# Patient Record
Sex: FEMALE | Race: OTHER | Employment: UNEMPLOYED | URBAN - METROPOLITAN AREA
[De-identification: names, ages, dates, MRNs, and addresses within clinical notes are randomized per-mention and may not be internally consistent; named-entity substitution may affect disease eponyms.]

---

## 2020-08-13 ENCOUNTER — INITIAL PRENATAL (OUTPATIENT)
Dept: OBGYN | Age: 20
End: 2020-08-13

## 2020-08-13 ENCOUNTER — HOSPITAL ENCOUNTER (OUTPATIENT)
Age: 20
Discharge: HOME | End: 2020-08-15

## 2020-08-13 VITALS — WEIGHT: 128 LBS | SYSTOLIC BLOOD PRESSURE: 114 MMHG | DIASTOLIC BLOOD PRESSURE: 73 MMHG | HEART RATE: 74 BPM

## 2020-08-13 LAB
BASOPHILS ABSOLUTE: 0.04 E9/L (ref 0–0.2)
BASOPHILS RELATIVE PERCENT: 0.5 % (ref 0–2)
EOSINOPHILS ABSOLUTE: 0.07 E9/L (ref 0.05–0.5)
EOSINOPHILS RELATIVE PERCENT: 0.9 % (ref 0–6)
GLUCOSE TOLERANCE SCREEN 50G: 147 MG/DL (ref 70–140)
GLUCOSE URINE, POC: NORMAL
HCT VFR BLD CALC: 37.9 % (ref 34–48)
HEMOGLOBIN: 12.3 G/DL (ref 11.5–15.5)
IMMATURE GRANULOCYTES #: 0.06 E9/L
IMMATURE GRANULOCYTES %: 0.8 % (ref 0–5)
LYMPHOCYTES ABSOLUTE: 1.31 E9/L (ref 1.5–4)
LYMPHOCYTES RELATIVE PERCENT: 16.5 % (ref 20–42)
MCH RBC QN AUTO: 31.8 PG (ref 26–35)
MCHC RBC AUTO-ENTMCNC: 32.5 % (ref 32–34.5)
MCV RBC AUTO: 97.9 FL (ref 80–99.9)
MONOCYTES ABSOLUTE: 0.48 E9/L (ref 0.1–0.95)
MONOCYTES RELATIVE PERCENT: 6 % (ref 2–12)
NEUTROPHILS ABSOLUTE: 6 E9/L (ref 1.8–7.3)
NEUTROPHILS RELATIVE PERCENT: 75.3 % (ref 43–80)
PDW BLD-RTO: 12.6 FL (ref 11.5–15)
PLATELET # BLD: 236 E9/L (ref 130–450)
PMV BLD AUTO: 11.1 FL (ref 7–12)
PROTEIN UA: NEGATIVE
RBC # BLD: 3.87 E12/L (ref 3.5–5.5)
WBC # BLD: 8 E9/L (ref 4.5–11.5)

## 2020-08-13 PROCEDURE — 82950 GLUCOSE TEST: CPT

## 2020-08-13 PROCEDURE — 86900 BLOOD TYPING SEROLOGIC ABO: CPT

## 2020-08-13 PROCEDURE — 81002 URINALYSIS NONAUTO W/O SCOPE: CPT | Performed by: OBSTETRICS & GYNECOLOGY

## 2020-08-13 PROCEDURE — 86762 RUBELLA ANTIBODY: CPT

## 2020-08-13 PROCEDURE — 36415 COLL VENOUS BLD VENIPUNCTURE: CPT | Performed by: OBSTETRICS & GYNECOLOGY

## 2020-08-13 PROCEDURE — 87340 HEPATITIS B SURFACE AG IA: CPT

## 2020-08-13 PROCEDURE — 86901 BLOOD TYPING SEROLOGIC RH(D): CPT

## 2020-08-13 PROCEDURE — 85025 COMPLETE CBC W/AUTO DIFF WBC: CPT

## 2020-08-13 PROCEDURE — 87591 N.GONORRHOEAE DNA AMP PROB: CPT

## 2020-08-13 PROCEDURE — 86787 VARICELLA-ZOSTER ANTIBODY: CPT

## 2020-08-13 PROCEDURE — 36415 COLL VENOUS BLD VENIPUNCTURE: CPT

## 2020-08-13 PROCEDURE — 99203 OFFICE O/P NEW LOW 30 MIN: CPT | Performed by: OBSTETRICS & GYNECOLOGY

## 2020-08-13 PROCEDURE — 86850 RBC ANTIBODY SCREEN: CPT

## 2020-08-13 PROCEDURE — 86703 HIV-1/HIV-2 1 RESULT ANTBDY: CPT

## 2020-08-13 PROCEDURE — 81220 CFTR GENE COM VARIANTS: CPT

## 2020-08-13 PROCEDURE — 87491 CHLMYD TRACH DNA AMP PROBE: CPT

## 2020-08-13 PROCEDURE — 86592 SYPHILIS TEST NON-TREP QUAL: CPT

## 2020-08-13 RX ORDER — PNV NO.95/FERROUS FUM/FOLIC AC 28MG-0.8MG
1 TABLET ORAL DAILY
Qty: 30 TABLET | Refills: 12
Start: 2020-08-13

## 2020-08-13 SDOH — HEALTH STABILITY: MENTAL HEALTH: HOW OFTEN DO YOU HAVE A DRINK CONTAINING ALCOHOL?: NEVER

## 2020-08-13 NOTE — PROGRESS NOTES
First visit for this pregnancy. Non-english speaking patient. Karyle Hensen here to translate today. Thinks her LMP was somewhere in the Month of January but does not remember when. Get referral to [facility] ASAP. appears to be about 28 weeks or so. Labs requested and Cultures done  WEill give tentative edc of 10/19/2020 based on uterine size.

## 2020-08-13 NOTE — PROGRESS NOTES
Patient is here today for new prenatal visit, Gem Montero was used to interpret today's visit. Patient had no prenatal care, patient is unsure of lmp. A 1 hour glucose was performed along with prenatal blood work and cultures.

## 2020-08-14 LAB
HEPATITIS B SURFACE ANTIGEN INTERPRETATION: NORMAL
HIV-1 AND HIV-2 ANTIBODIES: NORMAL
RPR: NORMAL

## 2020-08-15 LAB
ABO/RH: NORMAL
ANTIBODY SCREEN: NORMAL

## 2020-08-17 LAB
C TRACH DNA GENITAL QL NAA+PROBE: ABNORMAL
N. GONORRHOEAE DNA: NEGATIVE
RUBELLA ANTIBODY IGG: NORMAL
SOURCE: ABNORMAL

## 2020-08-18 LAB — VARICELLA-ZOSTER VIRUS AB, IGG: NORMAL

## 2020-08-19 ENCOUNTER — ROUTINE PRENATAL (OUTPATIENT)
Dept: OBGYN CLINIC | Age: 20
End: 2020-08-19

## 2020-08-19 VITALS
TEMPERATURE: 98.2 F | WEIGHT: 126 LBS | HEART RATE: 79 BPM | SYSTOLIC BLOOD PRESSURE: 119 MMHG | DIASTOLIC BLOOD PRESSURE: 81 MMHG

## 2020-08-19 PROCEDURE — 81002 URINALYSIS NONAUTO W/O SCOPE: CPT | Performed by: OBSTETRICS & GYNECOLOGY

## 2020-08-19 PROCEDURE — 76819 FETAL BIOPHYS PROFIL W/O NST: CPT | Performed by: OBSTETRICS & GYNECOLOGY

## 2020-08-19 PROCEDURE — 76811 OB US DETAILED SNGL FETUS: CPT | Performed by: OBSTETRICS & GYNECOLOGY

## 2020-08-19 PROCEDURE — 76820 UMBILICAL ARTERY ECHO: CPT | Performed by: OBSTETRICS & GYNECOLOGY

## 2020-08-19 PROCEDURE — 99203 OFFICE O/P NEW LOW 30 MIN: CPT | Performed by: OBSTETRICS & GYNECOLOGY

## 2020-08-19 PROCEDURE — 99202 OFFICE O/P NEW SF 15 MIN: CPT | Performed by: OBSTETRICS & GYNECOLOGY

## 2020-08-19 RX ORDER — AZITHROMYCIN 500 MG/1
1000 TABLET, FILM COATED ORAL ONCE
Qty: 2 TABLET | Refills: 0 | Status: SHIPPED | OUTPATIENT
Start: 2020-08-19 | End: 2020-08-19

## 2020-08-19 NOTE — PROGRESS NOTES
Pt denies bleeding,lof,ctxPt states good fetal movement. Kick counts encouraged. All questions answered+ information confirmed by pt using [employer] # [id number]

## 2020-08-19 NOTE — PATIENT INSTRUCTIONS
Please arrive for your scheduled appointment at least 15 minutes early with your actual insurance card+ a photo ID. Also if you need any refills ordered or have questions, it may take up 48 hours to reply. Please allow ample time for your refills. Call me when you use last refill. Thank you for your cooperation. Any questions contact [facility] at [phone]. If you are experiencing an emergency and need immediate help, call 911 or go to go emergency room or labor and delivery. if you are sick, not feeling well or have an infectious process going on please reschedule your appointment by calling [phone]. Also if any family members are not feeling well, please do not bring them to your appointment. We appreciate your cooperation. We are doing this in order to protect our pregnant mothers+ their babies. Call your primary obstetrician with bleeding, leaking of fluid, abdominal tenderness, headache, blurry vision, epigastric pain and increased urinary frequency. Do kick counts after dinner. Call your primary obstetrician if less than 10 kicks in 2 hours after dinner. Call your primary obstetrician with bleeding, leaking of fluid, abdominal tenderness, headache, blurry vision, epigastric pain and increased urinary frequency. Patient Education        Weeks 30 to 28 of Your Pregnancy: Care Instructions  Your Care Instructions     You have made it to the final months of your pregnancy. By now, your baby is really starting to look like a baby, with hair and plump skin. As you enter the final weeks of pregnancy, the reality of having a baby may start to set in. This is the time to settle on a name, get your household in order, set up a safe nursery, and find quality  if needed. Doing these things in advance will allow you to focus on caring for and enjoying your new baby.  You may also want to have a tour of your hospital's labor and delivery unit to get a better idea of what to expect while you are in the sodas.  · Evite doblarse hacia adelante o acostarse después de comer. · Ivonne kaden caminata corta después de comer. · Si tiene problemas de [place] estomacal marisa la noche, no coma por 2 horas antes de acostarse. · [place] antiácidos, toan Mylanta, Maalox, Rolaids o Tums. No tome antiácidos que contengan bicarbonato de sodio. Cuide las várices  · Las várices son vasos sanguíneos que se dilatan debido a la mayor cantidad de campos marisa el embarazo. Puede tener dolor o palpitaciones en las piernas. La mayoría de las várices desaparecerán después del [place]. · Evite estar reyes marisa mucho tiempo. Siéntese con las piernas cruzadas a la altura de los tobillos, no de las rodillas. · Siéntese con los pies elevados. · Evite usar ropa o medias ajustadas. Use medias de compresión. · Ivonne ejercicio con regularidad. Trate de caminar por lo menos 30 minutos al día. ¿Dónde puede encontrar más información en [facility]? [place] a [email or web address] e ingrese a sandy cuenta de MyChart. Caren Faustin M281 en el [facility] \"Search Health Information\" para más información (en inglés) sobre \"Semanas 30 a 28 de sandy embarazo: Instrucciones de cuidado. \"     Si no tiene kaden cuenta, ivonne jairo en el enlace \"Sign Up Now\". Revisado: 11 febrero, 2020               Versión del contenido: 12.5  © 3086-4658 Healthwise, Incorporated. Las instrucciones de cuidado fueron adaptadas bajo licencia por [facility] ([facility]). Si usted tiene [place] afección médica o sobre estas instrucciones, siempre pregunte a sandy profesional de balbir. [employer] niega toda garantía o responsabilidad por sandy uso de esta información. Patient Education        Henry Batter a sandy médico marisa el [address] [place] [address] [address] (después de 20 semanas)  Learning About When to Call Your Doctor During Pregnancy (After 20 Weeks)  Instrucciones de cuidado  Es normal que tenga inquietudes acerca de lo que podría ser un problema marisa el [address] [place] [address] [address]  Aunque la mayoría de las mujeres embarazadas no tienen ningún problema grave, es importante saber cuándo llamar a sandy médico si tiene determinados síntomas o señales de trabajo de [place]. Estas son algunas sugerencias generales. Sandy médico puede darle más información sobre cuándo llamar. Cuándo llamar a sandy médico (después de 20 semanas)  Llame al 911 en cualquier momento que considere que necesita atención de [place]. Por ejemplo, llame si:  · Tiene sangrado vaginal intenso. · Tiene dolor repentino e intenso en el abdomen. · Se desmayó (perdió el conocimiento). · Tiene kaden convulsión. · Ve o siente el cordón umbilical.  · Vanessa que está a punto de laurie a marco a sandy bebé y no puede llegar en forma cool al hospital.  Veronda Flynn a sandy médico ahora mismo o busque atención médica inmediata si:  · Tiene sangrado vaginal.  · Tiene dolor en el abdomen. · Tiene fiebre. · Tiene síntomas de preeclampsia, toan:  ? [place] repentina de la keith, las papito o los pies. ? Nuevos problemas de visión (toan oscurecimiento, ameya borroso o ameya puntos). ? Dolor de everett intenso. · Tiene kaden pérdida repentina de líquido por la vagina. (Piensa que rompió la clarke). · Piensa que puede keiry comenzado el [place] de [place]. [place] significa que ha tenido al menos 6 contracciones en [employer]. · Nota que sandy bebé ha dejado de moverse o lo hace mucho menos de lo habitual.  · Tiene síntomas de kaden infección urinaria. Estos pueden incluir:  ? Dolor o ardor al orinar. ? Necesidad de orinar con frecuencia sin poder eliminar mucha orina. ? Dolor en el flanco, que se encuentra mike debajo de la caja torácica y [place] de la cintura en un lado de la espalda. ? [employer] Preste especial atención a los cambios en sandy balbir y asegúrese de comunicarse con sandy médico si:  · Tiene flujo vaginal con un olor desagradable. · Tiene cambios en la piel, tales toan:  ? Salpullido. ? Comezón. ? Color amarillento en la piel.   · Tiene otras inquietudes acerca de sandy embarazo. Si tiene signos de trabajo de parto al llegar a las [address] [facility] o más  Si tiene señales de [place] de parto a las 37 semanas o [place], es posible que sandy médico le diga que llame cuando sandy trabajo de parto se vuelva más [place]. Los síntomas del trabajo de parto activo incluyen:  · Contracciones que son regulares. · Contracciones a intervalos de menos de 5 minutos. · Contracciones marisa las cuales es difícil hablar. La atención de seguimiento es kaden parte clave de sandy tratamiento y seguridad. Asegúrese de hacer y acudir a todas las citas, y llame a sandy médico si está teniendo problemas. También es kaden buena idea saber los resultados de dorcas exámenes y mantener kaden lista de los medicamentos que corey. ¿Dónde puede encontrar más información en inglés? Terrell Anchors a [email or web address] e ingrese a sandy cuenta de Birgit Abad Person X365 en el Nadine Vinson \"Search Health Information\" para más información (en inglés) sobre \"Aprenda cuándo llamar a sandy médico marisa el embarazo (después de 20 semanas). \"     Si no tiene kaden cuenta, ivonne jairo en el enlace \"Sign Up Now\". Revisado: 11 febrero, 2020               Versión del contenido: 12.5  © 8138-2776 Healthwise, Incorporated. Las instrucciones de cuidado fueron adaptadas bajo licencia por [facility] ([facility]). Si usted tiene [place] afección médica o sobre estas instrucciones, siempre pregunte a sandy profesional de balbir. Healthwise, Incorporated niega toda garantía o responsabilidad por sandy uso de esta información. Patient Education        Conteo de las patadas de sandy bebé: Instrucciones de cuidado  [employer] Your Baby's Kicks: Care Instructions  Instrucciones de cuidado    Contar las patadas de sandy bebé es kaden manera en la que sandy médico puede establecer si sandy bebé es saludable. La mayoría de las [facility], sobre todo en el primer embarazo, siente que sandy bebé se mueve por primera vez entre las semanas 12 y 25.  El movimiento puede sentirse [place] el enlace \"Sign Up Now\". Revisado: 11 febrero, 2020               Versión del contenido: 12.5  © 3357-2205 Healthwise, [employer]. Las instrucciones de cuidado fueron adaptadas bajo licencia por [facility] ([facility]). Si usted tiene [place] afección médica o sobre estas instrucciones, siempre pregunte a sandy profesional de balbir. [employer] niega toda garantía o responsabilidad por sandy uso de esta información.

## 2020-08-25 ENCOUNTER — NURSE ONLY (OUTPATIENT)
Dept: PRIMARY CARE CLINIC | Age: 20
End: 2020-08-25

## 2020-08-25 ENCOUNTER — HOSPITAL ENCOUNTER (OUTPATIENT)
Age: 20
Discharge: HOME | End: 2020-08-27

## 2020-08-25 PROCEDURE — U0003 INFECTIOUS AGENT DETECTION BY NUCLEIC ACID (DNA OR RNA); SEVERE ACUTE RESPIRATORY SYNDROME CORONAVIRUS 2 (SARS-COV-2) (CORONAVIRUS DISEASE [COVID-19]), AMPLIFIED PROBE TECHNIQUE, MAKING USE OF HIGH THROUGHPUT TECHNOLOGIES AS DESCRIBED BY CMS-2020-01-R: HCPCS

## 2020-08-26 LAB
SARS-COV-2: NOT DETECTED
SOURCE: NORMAL

## 2020-08-26 NOTE — PROGRESS NOTES
[facility]  [address] [place] [address].  [address] [facility], [place]. [address]  Ph: [phone]    Fax: [phone]   2020    RE: Loyd Chana  2000  Dear Dr. Nila Elizondo    : Thank you for sending   Ms. Raj Hogue  for consultation and ultrasound  in our office   on  2020. REASON FOR CONSULTATION: 17yo  ? [facility] 10/19/2020. Non-English Speaking- discussed through tele- . Young Maternal Age- Mauricio Greco stephanie     Her chart was reviewed, her medical, surgical , and OBG history was examined along with any supporting documents available to me .  Her recent clinical visits and notes were reviewed.  Her recent laboratory and pathology  testing was reviewed  Review of Systems :    CONSTITUTIONAL: No fever, no chills , no undue aches, pain    HEENT: No headache, no visual changes, no sore throat    PULM: No dyspnea, no cough   CARDIO:  No chest pains, no palpitations   GI: No N/V, no D/C, no diarrhea, no abdominal pain     : No dysuria, no vaginal bleeding or fluid leaking or discharge    She reports good fetal movement   PHYSICAL EXAMINATION: VSS - Afebrile     General Appearance: Healthy looking, alert , no acute distress.  Eyes: No pallor, no icterus, no photophobia.  Back: No CVA tenderness.  Abdomen: Soft, non-tender.  Extremities: No pretibial pitting edema    An ultrasound evaluation was done today. Please refer to the enclosed copy of the ultrasound report for further detailed information. ULTRASOUND IMPRESSION:    ? US is not diagnostic for fetal aneuploidy and may not detect all structural defects even if multiple exams are performed. ? Normal ultrasound findings cannot guarantee normal pregnancy outcomes. ? Within developmental and technical limits of ultrasound assessment,   ? Vertex fetus @  31w3d  ? Active, responsive baby.  The amniotic fluid volume appears normal.   ? The fetus is measuring small/ ? appropriate for greater than 50% of the time was used to  the patient, discuss complications and problems related to her pregnancy, or coordinating her care. I answered all of her questions to her satisfaction.

## 2020-08-27 LAB
GLUCOSE URINE, POC: NORMAL
PROTEIN UA: NEGATIVE

## 2020-08-28 LAB
CYSTIC FIBROSIS 165 VARIANTS INTERP: NORMAL
CYSTIC FIBROSIS 5T VARIANT: NORMAL
CYSTIC FIBROSIS ALLELE 1: NEGATIVE
CYSTIC FIBROSIS ALLELE 2: NEGATIVE

## 2020-08-31 ENCOUNTER — HOSPITAL ENCOUNTER (OUTPATIENT)
Age: 20
Discharge: HOME | End: 2020-08-31

## 2020-08-31 LAB
GLUCOSE TOLERANCE TEST 1 HOUR: 119 MG/DL
GLUCOSE TOLERANCE TEST 2 HOUR: 107 MG/DL
GLUCOSE TOLERANCE TEST FASTING: 77 MG/DL

## 2020-08-31 PROCEDURE — 36415 COLL VENOUS BLD VENIPUNCTURE: CPT

## 2020-08-31 PROCEDURE — 82951 GLUCOSE TOLERANCE TEST (GTT): CPT

## 2020-09-10 ENCOUNTER — HOSPITAL ENCOUNTER (OUTPATIENT)
Age: 20
Discharge: HOME | End: 2020-09-12

## 2020-09-10 ENCOUNTER — ROUTINE PRENATAL (OUTPATIENT)
Dept: OBGYN | Age: 20
End: 2020-09-10

## 2020-09-10 VITALS — DIASTOLIC BLOOD PRESSURE: 66 MMHG | SYSTOLIC BLOOD PRESSURE: 102 MMHG | WEIGHT: 135 LBS | HEART RATE: 76 BPM

## 2020-09-10 PROCEDURE — 87591 N.GONORRHOEAE DNA AMP PROB: CPT

## 2020-09-10 PROCEDURE — 87491 CHLMYD TRACH DNA AMP PROBE: CPT

## 2020-09-10 PROCEDURE — 99212 OFFICE O/P EST SF 10 MIN: CPT | Performed by: OBSTETRICS & GYNECOLOGY

## 2020-09-10 PROCEDURE — 99213 OFFICE O/P EST LOW 20 MIN: CPT | Performed by: OBSTETRICS & GYNECOLOGY

## 2020-09-10 PROCEDURE — 87081 CULTURE SCREEN ONLY: CPT

## 2020-09-10 NOTE — PROGRESS NOTES
Here for routine prenatl visit. Centering pregnancy. Now 34+ weeks. Will do cultures today and send to lab. Marilu Potter here to translate for this entire visit today. Denies any problems at this time. No LOF or cramping. Labor warnings given. All questions answered. RTC weekly to delivery. Needs follow up at [facility] scheduled.

## 2020-09-13 LAB
C TRACH DNA GENITAL QL NAA+PROBE: NEGATIVE
GROUP B STREP CULTURE: NORMAL
N. GONORRHOEAE DNA: NEGATIVE
SOURCE: NORMAL

## 2020-09-17 ENCOUNTER — ROUTINE PRENATAL (OUTPATIENT)
Dept: OBGYN | Age: 20
End: 2020-09-17

## 2020-09-17 VITALS — SYSTOLIC BLOOD PRESSURE: 100 MMHG | DIASTOLIC BLOOD PRESSURE: 63 MMHG | WEIGHT: 140 LBS | HEART RATE: 67 BPM

## 2020-09-17 LAB
GLUCOSE URINE, POC: NORMAL
PROTEIN UA: NEGATIVE

## 2020-09-17 PROCEDURE — 99212 OFFICE O/P EST SF 10 MIN: CPT | Performed by: OBSTETRICS & GYNECOLOGY

## 2020-09-17 PROCEDURE — 99213 OFFICE O/P EST LOW 20 MIN: CPT | Performed by: OBSTETRICS & GYNECOLOGY

## 2020-09-17 PROCEDURE — 81002 URINALYSIS NONAUTO W/O SCOPE: CPT | Performed by: OBSTETRICS & GYNECOLOGY

## 2020-09-17 NOTE — PROGRESS NOTES
Here for routine prenatal, Centering pregnancy. Evon Bello here to translate for this entire visit. GBS was Negative. Denies any problems at this time. Denies LOF, has good movement. No Cramping. Has MFM follow up tomorrow. RTC 1 week to delivery. Labor warnings given. All questions answered.

## 2020-09-18 ENCOUNTER — ROUTINE PRENATAL (OUTPATIENT)
Dept: OBGYN CLINIC | Age: 20
End: 2020-09-18

## 2020-09-18 VITALS
WEIGHT: 140 LBS | DIASTOLIC BLOOD PRESSURE: 72 MMHG | HEART RATE: 65 BPM | TEMPERATURE: 97.7 F | SYSTOLIC BLOOD PRESSURE: 107 MMHG

## 2020-09-18 PROCEDURE — 99214 OFFICE O/P EST MOD 30 MIN: CPT | Performed by: OBSTETRICS & GYNECOLOGY

## 2020-09-18 PROCEDURE — 76816 OB US FOLLOW-UP PER FETUS: CPT | Performed by: OBSTETRICS & GYNECOLOGY

## 2020-09-18 PROCEDURE — 76820 UMBILICAL ARTERY ECHO: CPT | Performed by: OBSTETRICS & GYNECOLOGY

## 2020-09-18 PROCEDURE — 81002 URINALYSIS NONAUTO W/O SCOPE: CPT | Performed by: OBSTETRICS & GYNECOLOGY

## 2020-09-18 PROCEDURE — 76819 FETAL BIOPHYS PROFIL W/O NST: CPT | Performed by: OBSTETRICS & GYNECOLOGY

## 2020-09-18 PROCEDURE — 76821 MIDDLE CEREBRAL ARTERY ECHO: CPT | Performed by: OBSTETRICS & GYNECOLOGY

## 2020-09-18 PROCEDURE — 99213 OFFICE O/P EST LOW 20 MIN: CPT | Performed by: OBSTETRICS & GYNECOLOGY

## 2020-09-18 NOTE — PATIENT INSTRUCTIONS
Please arrive for your scheduled appointment at least 15 minutes early with your actual insurance card+ a photo ID. Also if you need any refills ordered or have questions, it may take up 48 hours to reply. Please allow ample time for your refills. Call me when you use last refill. Thank you for your cooperation. Any questions contact [facility] at [phone]. If you are experiencing an emergency and need immediate help, call 911 or go to go emergency room or labor and delivery. if you are sick, not feeling well or have an infectious process going on please reschedule your appointment by calling [phone]. Also if any family members are not feeling well, please do not bring them to your appointment. We appreciate your cooperation. We are doing this in order to protect our pregnant mothers+ their babies. Call your primary obstetrician with bleeding, leaking of fluid, abdominal tenderness, headache, blurry vision, epigastric pain and increased urinary frequency. Do kick counts after dinner. Call your primary obstetrician if less than 10 kicks in 2 hours after dinner. Call your primary obstetrician with bleeding, leaking of fluid, abdominal tenderness, headache, blurry vision, epigastric pain and increased urinary frequency. You might be having an NST at your next appt. Please eat a large snack or breakfast before coming to office. Thank you  Patient Education        Semanas 34 a 39 de sandy embarazo: Instrucciones de cuidado  Weeks 34 to 36 of Your Pregnancy: Care Instructions  Instrucciones de cuidado    A estas [place], sandy bebé y sandy abdomen habrán crecido considerablemente. Holly es [place] de laurie a marco. Los pulmones de sandy bebé están holly listos para respirar aire. Los huesos de la everett de sandy bebé ahora son bastante firmes toan para protegerla rekha se mantienen lo suficientemente blandos toan para atravesar el canal de [place]. Es posible que sienta entusiasmo, steve, ansiedad o miedo.  Cindy Frances se pregunte cómo se dará cuenta de si está en trabajo de parto o qué esperar en sabas momento. Trate de ser flexible con dorcas expectativas respecto del nacimiento. Dado que cada nacimiento es diferente, no hay manera de saber exactamente cómo será sandy parto. Esta hoja de cuidados la ayudará a saber qué esperar y cómo prepararse. Le podría facilitar el parto. Si todavía no le solis aplicado la vacuna Tdap (tétanos, difteria y tos [place]) marisa soledad [place], hable con sandy médico acerca de aplicársela. [place] a proteger a sandy recién nacido contra la infección por tos ferina. En la semana 36, a la mayoría de las mujeres se les hace kaden prueba de estreptococos del geeta B (GBS, por dorcas siglas en inglés). Los estreptococos del geeta B son bacterias comunes que pueden vivir en la vagina y el recto. Pueden hacer que sandy bebé se enferme después del parto. Si el resultado es positivo, usted recibirá antibióticos marisa el trabajo de [place]. Los medicamentos evitarán que sandy bebé contraiga las bacterias. La atención de seguimiento es kaden parte clave de sandy tratamiento y seguridad. Asegúrese de hacer y acudir a todas las citas, y llame a sandy médico si está teniendo problemas. También es kaden buena idea saber los resultados de dorcas exámenes y mantener kaden lista de los medicamentos que corey. ¿Cómo puede cuidarse en el hogar? Aprenda sobre las alternativas para aliviar el dolor  · El dolor se manifiesta de modo diferente en cada todd. Hable con sandy médico acerca de dorcas sentimientos sobre el dolor. · Puede elegir entre varias formas de aliviar el dolor. Estas incluyen medicamentos o técnicas de respiración, así toan medidas para estar cómoda. Usted puede utilizar más de [place] opción. · Si elige un analgésico (medicamento para el dolor) marisa el trabajo de [place], hable con sandy médico acerca de dorcas opciones. Algunos medicamentos reducen la ansiedad y [facility] a aliviar parte del dolor.  Otros adormecen la parte inferior del cuerpo para que no sienta dolor.  · Asegúrese de decirle a sandy médico acerca de sandy elección de analgésico antes de empezar el [place] de parto o muy temprano en el [place] de [place]. Es posible que pueda cambiar de parecer a medida que avanza el [place] de [place]. · Savana vez se duerme a kaden todd con medicamentos administrados a través de kaden máscara o por vía intravenosa (IV). Trabajo de parto y [place]  · La primera etapa del [place] de parto se divide en fernie fases: Antoine Dash y de transición. ? La mayoría de las mujeres experimentan la fase latente del [place] de parto en dorcas hogares. Usted puede [employer] o descansar, comer refrigerios livianos, beber líquidos sylwia y comenzar a contar las contracciones. ? Cuando advierta que se le vuelve difícil hablar marisa kaden contracción, es posible que esté por pasar a la fase activa. Marisa la fase Lola Donnelly, debería ir al hospital si no está allí aún. ? Usted está en la fase activa cuando tiene contracciones cada 3 o 4 minutos y young alrededor de 60 segundos. El ralf uterino comienza a abrirse con más rapidez.  ? Si se le rompe la clarke, las contracciones serán más intensas y más frecuentes. ? Marisa la fase de transición, el ralf uterino se estira y las contracciones se producen con [place]. ? Lindsay Juan Pablo tenga deseos de pujar, sin embargo es posible que el ralf uterino aún no esté preparado. El médico le dirá cuándo pujar. · La segunda etapa comienza cuando el ralf uterino se abre por completo y usted está lista para pujar. ? Las contracciones son muy intensas a fin de empujar al bebé por el canal de parto. ? Sentirá la necesidad de pujar. Podría sentir toan si tuviera ganas de evacuar el intestino. ? Hayde Karvonen entrenen a [employer]. Estas contracciones serán muy intensas rekha no ocurrirán con tanta frecuencia. Puede descansar un poco entre contracciones. ? Es posible que esté sensible e irritable.  Es posible que no se dé cuenta de lo que pasa a sandy alrededor. ? Un último esfuerzo y habrá nacido sandy bebé. · La tercera etapa ocurre cuando con unas cuantas contracciones más se expulsa la placenta. [place] puede durar 30 minutos o menos. · La cuarta etapa es la de recuperación. Es posible que se sienta abrumada con las emociones y exhausta rekha alerta. Pilar es un buen momento para comenzar el amamantamiento. ¿Dónde puede encontrar más información en inglés? Amyn Leticia a [email or web address] e ingrese a sandy cuenta de MyChart. Maxine Zuleta B622 en el [facility] \"Search Health Information\" para más información (en inglés) sobre \"Semanas 29 a 39 de sandy embarazo: Instrucciones de cuidado. \"     Si no tiene kaden cuenta, ivonne jairo en el enlace \"Sign Up Now\". Revisado: 11 febrero, 2020               Versión del contenido: 12.5  © 6766-1738 Healthwise, Incorporated. Las instrucciones de cuidado fueron adaptadas bajo licencia por [facility] ([facility]). Si usted tiene Gary Willis afección médica o sobre estas instrucciones, siempre pregunte a sandy profesional de balbir. Healthwise, Incorporated niega toda garantía o responsabilidad por sandy uso de esta información. Patient Education        Hilario Carls a sandy médico marisa el [address] [facility] [address] (después de 20 semanas)  Learning About When to Call Your Doctor During Pregnancy (After 20 Weeks)  Instrucciones de cuidado  Es normal que tenga inquietudes acerca de lo que podría ser un problema marisa el [address] [facility] [facility] [address]. Aunque la mayoría de las mujeres embarazadas no tienen ningún problema grave, es importante saber cuándo llamar a sandy médico si tiene determinados síntomas o señales de trabajo de [place]. Estas son algunas sugerencias generales. Sandy médico puede darle más información sobre cuándo llamar. Cuándo llamar a sandy médico (después de 20 semanas)  Llame al 911 en cualquier momento que considere que necesita atención de [place]. Por ejemplo, llame si:  · Tiene sangrado vaginal intenso.   · Tiene dolor repentino e intenso en el abdomen. · Se desmayó (perdió el conocimiento). · Tiene kaden convulsión. · Ve o siente el cordón umbilical.  · Vanessa que está a punto de laurie a marco a sandy bebé y no puede llegar en forma cool al hospital.  Renee Le a sandy médico ahora mismo o busque atención médica inmediata si:  · Tiene sangrado vaginal.  · Tiene dolor en el abdomen. · Tiene fiebre. · Tiene síntomas de preeclampsia, toan:  ? Hinchazón repentina de la keith, las papito o los pies. ? Nuevos problemas de visión (toan oscurecimiento, ameya borroso o ameya puntos). ? Dolor de everett intenso. · Tiene kaden pérdida repentina de líquido por la vagina. (Piensa que rompió la clarke). · Piensa que puede keiry comenzado el [place] de [place]. Vivian significa que ha tenido al menos 6 contracciones en [employer]. · Nota que sandy bebé ha dejado de moverse o lo hace mucho menos de lo habitual.  · Tiene síntomas de kaden infección urinaria. Estos pueden incluir:  ? Dolor o ardor al orinar. ? Necesidad de orinar con frecuencia sin poder eliminar mucha orina. ? Dolor en el flanco, que se encuentra mike debajo de la caja torácica y [place] de la cintura en un lado de la espalda. ? [employer]. Preste especial atención a los cambios en sandy balbir y asegúrese de comunicarse con sandy médico si:  · Tiene flujo vaginal con un olor desagradable. · Tiene cambios en la piel, tales toan:  ? Salpullido. ? Comezón. ? Color amarillento en la piel. · Tiene otras inquietudes acerca de sandy embarazo. Si tiene signos de trabajo de parto al llegar a las [address] o más  Si tiene señales de [place] de parto a las 37 semanas o [place], es posible que sandy médico le diga que llame cuando sandy trabajo de parto se vuelva más [place]. Los síntomas del trabajo de parto activo incluyen:  · Contracciones que son regulares. · Contracciones a intervalos de menos de 5 minutos. · Contracciones marisa las cuales es difícil hablar.   La atención de seguimiento es kaden parte clave de sandy tratamiento y seguridad. Asegúrese de hacer y acudir a todas las citas, y llame a sandy médico si está teniendo problemas. También es kaden buena idea saber los resultados de dorcas exámenes y mantener kaden lista de los medicamentos que corey. ¿Dónde puede encontrar más información en inglés? Shelly Alegrias a [email or web address] e ingrese a sandy cuenta de MyChart. Ezra Larry M166 en el Annye Sandoval \"Search Health Information\" para más información (en inglés) sobre \"Aprenda cuándo llamar a sandy médico marisa el embarazo (después de 20 semanas). \"     Si no tiene kaden cuenta, ivonne jairo en el enlace \"Sign Up Now\". Revisado: 11 febrero, 2020               Versión del contenido: 12.5  © 7172-0752 Healthwise, Incorporated. Las instrucciones de cuidado fueron adaptadas bajo licencia por [facility] ([facility]). Si usted tiene Benld Winger afección médica o sobre estas instrucciones, siempre pregunte a sandy profesional de balbir. Healthwise, Incorporated niega toda garantía o responsabilidad por sandy uso de esta información. Patient Education        Conteo de las patadas de sandy bebé: Instrucciones de cuidado  [employer] Your Baby's Kicks: Care Instructions  Instrucciones de cuidado    Contar las patadas de sandy bebé es kaden manera en la que sandy médico puede establecer si sandy bebé es saludable. La mayoría de las [facility], sobre todo en el primer embarazo, siente que sandy bebé se mueve por primera vez entre las semanas 12 y 25. El movimiento puede sentirse más toan aleteos que toan patadas. Es posible que sandy bebé se mueva más a ciertas horas del día. Cuando usted Yoan Fisher, puede notar menos patadas que cuando está descansando. En dorcas visitas prenatales, sandy médico le preguntará si el bebé está activo. En el último trimestre, sandy médico le puede pedir que cuente la cantidad de veces que sienta que el bebé se [place]. La atención de seguimiento es kaden parte clave de sandy tratamiento y seguridad.  Asegúrese de hacer y acudir a todas las citas, y llame a sandy médico si está teniendo problemas. También es kaden buena idea saber los resultados de dorcas exámenes y mantener kaden lista de los medicamentos que corey. ¿Cómo se cuentan las patadas fetales? · Un método común para revisar el movimiento de sandy bebé es contar la cantidad de patadas o movimientos que sienta en 1 hora. Es normal sentir 10 movimientos (toan patadas, aleteos o vueltas) en 1 hora. Algunos médicos sugieren que cuente marisa la [facility] llegar a los 10 movimientos. Luego usted puede dejar de contar por sabas día y comenzar otra vez al día siguiente. · Para contar, elija el momento del día en que sandy bebé esté más activo. Podría ser cualquier momento entre la mañana y la noche. · Si no siente 10 movimientos en kaden hora, es posible que sandy bebé esté durmiendo. Espere hasta la próxima hora y vuelva a contar. ¿Cuándo debe pedir ayuda? Llame a sandy médico ahora mismo o busque atención médica inmediata si:  · Siente que sandy bebé ha dejado de moverse o se mueve mucho menos de lo normal.  Preste especial atención a los cambios en sandy balbir y asegúrese de comunicarse con sandy médico si tiene cualquier problema. ¿Dónde puede encontrar más información en inglés? Unruly Pickard a [email or web address] e ingrese a sandy cuenta de [place]. Juan Pablo Varela W824 en el [facility] \"Search Health Information\" para más información (en inglés) sobre \"Conteo de las patadas de sandy bebé: Instrucciones de cuidado. \"     Si no tiene kaden cuenta, ivonne jairo en el enlace \"Sign Up Now\". Revisado: 11 febrero, 2020               Versión del contenido: 12.5  © 8509-5045 Healthwise, [employer]. Las instrucciones de cuidado fueron adaptadas bajo licencia por [facility] ([facility]). Si usted tiene [place] afección médica o sobre estas instrucciones, siempre pregunte a sandy profesional de balbir. Relaborate, [employer] niega toda garantía o responsabilidad por sandy uso de esta información.

## 2020-09-24 ENCOUNTER — ROUTINE PRENATAL (OUTPATIENT)
Dept: OBGYN | Age: 20
End: 2020-09-24

## 2020-09-24 VITALS
SYSTOLIC BLOOD PRESSURE: 102 MMHG | DIASTOLIC BLOOD PRESSURE: 65 MMHG | TEMPERATURE: 97.5 F | WEIGHT: 138 LBS | HEART RATE: 78 BPM

## 2020-09-24 LAB
GLUCOSE URINE, POC: NEGATIVE
PROTEIN UA: NEGATIVE

## 2020-09-24 PROCEDURE — 81002 URINALYSIS NONAUTO W/O SCOPE: CPT | Performed by: OBSTETRICS & GYNECOLOGY

## 2020-09-24 PROCEDURE — 99212 OFFICE O/P EST SF 10 MIN: CPT | Performed by: OBSTETRICS & GYNECOLOGY

## 2020-09-24 PROCEDURE — 99213 OFFICE O/P EST LOW 20 MIN: CPT | Performed by: OBSTETRICS & GYNECOLOGY

## 2020-09-24 NOTE — PROGRESS NOTES
Here for routine prenatal visit, [facility], . Doing well. No apparent problems or complaints. Denies cramping or LOF. Tiajuana Rumble here to translate for the entire visit today. RTC 1 week and weekly to delivery. GBS was negative. Has MFM follow up on 29th.

## 2020-09-29 ENCOUNTER — ROUTINE PRENATAL (OUTPATIENT)
Dept: OBGYN CLINIC | Age: 20
End: 2020-09-29

## 2020-09-29 VITALS
DIASTOLIC BLOOD PRESSURE: 75 MMHG | SYSTOLIC BLOOD PRESSURE: 117 MMHG | WEIGHT: 142 LBS | TEMPERATURE: 97.6 F | HEART RATE: 75 BPM

## 2020-09-29 PROBLEM — O09.33 LIMITED PRENATAL CARE IN THIRD TRIMESTER: Status: ACTIVE | Noted: 2020-09-29

## 2020-09-29 PROBLEM — R62.52 SMALL STATURE: Status: ACTIVE | Noted: 2020-09-29

## 2020-09-29 PROBLEM — O35.HXX0 SHORT FEMUR OF FETUS ON PRENATAL ULTRASOUND: Status: ACTIVE | Noted: 2020-09-29

## 2020-09-29 LAB
GLUCOSE URINE, POC: NORMAL
PROTEIN UA: NEGATIVE

## 2020-09-29 PROCEDURE — 99213 OFFICE O/P EST LOW 20 MIN: CPT | Performed by: OBSTETRICS & GYNECOLOGY

## 2020-09-29 PROCEDURE — 76816 OB US FOLLOW-UP PER FETUS: CPT | Performed by: OBSTETRICS & GYNECOLOGY

## 2020-09-29 PROCEDURE — 81002 URINALYSIS NONAUTO W/O SCOPE: CPT | Performed by: OBSTETRICS & GYNECOLOGY

## 2020-09-29 PROCEDURE — 76818 FETAL BIOPHYS PROFILE W/NST: CPT | Performed by: OBSTETRICS & GYNECOLOGY

## 2020-09-29 PROCEDURE — 76820 UMBILICAL ARTERY ECHO: CPT | Performed by: OBSTETRICS & GYNECOLOGY

## 2020-09-29 NOTE — LETTER
20    MD Sommer Mcneil 27  [place],  [facility]     RE:  Alessandra Snell  : 2000   AGE: 21 y.o. This report has been created using voice recognition software. It may contain errors which are inherent in voice recognition technology. Dear Dr. Rafael Stevenson:    [facility] had an appointment today for the following indications:    Patient Active Problem List   Diagnosis    Small stature    Short femur of fetus on prenatal ultrasound     [facility] is a 21 y.o. female, who is G1(0). She has an Estimated Date of Delivery: 10/19/20 based on her LMP and previous ultrasound assessment. She is currently 37 weeks 1 days gestation based on that assessment. The patient does not speak [place].  Ms Jaycee Mondragon was present during her evaluation. The patient has had no major problems since her last visit. She states that her fetal movement has been good. She has had no increased vaginal discharge, vaginal bleeding, back pain, dysuria, hematuria, or leaking of amniotic fluid. The NST today was reactive with moderate variability and accelerations. Asymptomatic uterine contractions were noted. A fetal ultrasound assessment was performed today. The estimated fetal weight is at the 16th%. The ABDELRAHMAN is 8.4 cm. The BPP and cord Doppler studies were both reassuring. No apparent gross fetal anatomic abnormalities have been identified, however visualization is somewhat limited secondary to the advanced gestational age of the fetus and the fetal position. The mother and father to the baby are both of small stature. The small fetal size is most likely a familial variation.                         GENETIC SCREENING/TERATOLOGY COUNSELING                      (Includes patient, FTB, and any affected family members)    Patient Age > 35 Years NO   Thalassemia ( MVC<80) NO   Congential Heart Defect NO   Neural Tube Defect NO   Evgeny-Sachs NO Sickle Cell Disease NO   Sickle Cell Trait NO   Sickle C Disease or Trait NO   Hemophilia NO   Muscular Dystrophy NO   Cystic Fibrosis NO   [place] Disease NO   Autism NO   Mental Retardation NO   History of Fragile X NO   Maternal Diabetes NO   Other Genetic Disease or Syndrome NO   Previous Child With Congenital Abnormality Not Listed NO   Recreational Drugs NO                                             INFECTION HISTORY     HEPATITIS IMMUNIZED:  YES   HEPATITIS INFECTION:  NO   EXPOSURE TO TB NO   GENITAL HERPES    NO   PARVOVIRUS B-19 NO   CHICKEN POX  NO   MEASLES NO   STD NO   HIV NO   OTHER RASH OR VIRAL ILLNESS SINCE LMP NO   UTI RECURRENT NO   HPV NO     OB History    Para Term  AB Living   1             SAB TAB Ectopic Molar Multiple Live Births                    # Outcome Date GA Lbr Navneet/2nd Weight Sex Delivery Anes PTL Lv   1 Current               Obstetric Comments   Used  #[id number]. Patient very poor historian. PAST GYNECOLOGICAL  HISTORY:  Negative for abnormal pap smears. Negative for sexually transmitted diseases. Negative for cervical LEEP / conization /cryosurgery. Negative for uterine surgery. Negative for ovarian or tubal surgery. History reviewed. No pertinent past medical history. History reviewed. No pertinent surgical history. No Known Allergies      Current Outpatient Medications:     Prenatal Vit-Fe Fumarate-FA (PRENATAL VITAMINS) 28-0.8 MG TABS, Take 1 tablet by mouth daily Indications: samples, Disp: 30 tablet, Rfl: 12    Social History     Tobacco Use    Smoking status: Never Smoker    Smokeless tobacco: Never Used   Substance Use Topics    Alcohol use: Never     Frequency: Never       FAMILY MEDICAL HISTORY:   Negative for congenital abnormalities, autism, genetic disease and mental retardation, not listed above.    Review of Systems :   CONSTITUTIONAL : No fever, no chills HEENT : No headache, no visual changes, no rhinorrhea, no sore throat   CARDIOVASCULAR : No pain, no palpitations, no edema   RESPIRATORY : No pain, no shortness of breath   GASTROINTESTINAL : No N/V, no D/C, no abdominal pain   GENITOURINARY : No dysuria, hematuria and no incontinence   MUSCULOSKELETAL : No myalgia, No back pain  NEUROLOGICAL : No numbness, no tingling, no tremors. No history of seizures  ALL OTHER SYSTEMS WERE REPORTED AS NEGATIVE. PERTINENT PHYSICAL EXAMINATION:   /75   Pulse 75   Temp 97.6 °F (36.4 °C)   Wt 142 lb (64.4 kg)   LMP 01/13/2020 (Approximate)   Urine dipstick:   Negative for Glucose    Negative for Albumin    GENERAL:   The patient is a well developed, female who is alert cooperative and oriented times three in no acute distress. HEENT:  Normo cephalic and atraumatic. No facial edema. ABDOMEN:   Her uterus is gravid. She had no complaint of abdominal pain or tenderness. The fetal heart rate is 134 bpm. The fetus is in the cephalic presentation which was confirmed by the ultrasound assessment. EXTREMITIES:  No peripheral edema is noted. A fetal ultrasound assessment was performed today. A report is enclosed for your review. IMPRESSION:  1.  IUP at 37 weeks 1 days gestation based on her Estimated Date of Delivery: 10/19/20  2. Insufficient prenatal care   3. Asymptomatic uterine contractions   4. Short fetal femur (mother and father are of short stature)  5. EFW 16th%  6.  Reassuring BPP and cord Doppler testing   7. Reactive NST   8. GBS negative    PLAN:  The patient has had limited prenatal care. Based on this, I would recommend that the patient count fetal movements and call if she notices any subjective decrease in fetal movements, particularly if there are less than 10 major movements in an hour. Non-stress testing should be performed every 3 to 4 days through the balance of the pregnancy.  Serial ultrasounds to assess fetal anatomy and growth should be performed. The patient is at increase risk for  morbidity and mortality secondary to her history. Weekly BPP and cord Doppler testing should be performed, unless there is a clinical indication to perform the testing more frequently. The patient was advised to call if she has any increased vaginal discharge, vaginal bleeding, contractions, abdominal pain, back pain or any new significant symptomatology prior to her next visit. I advised her that these are signs and symptoms of cervical change and require follow-up assessment when they occur. I requested the patient return for a follow-up assessment in 1 week unless there is a clinical reason for her to return prior to that time. She is to call if she has any problems or questions prior to her next visit. Further evaluation and management will be dependent on her clinical presentation and the results of her testing. The patient is to continue to follow with you in your office for ongoing obstetric care. I spent 16 minutes of direct contact time with the patient of which greater than 50% of the time was to discuss complications and problems related to her pregnancy. I discussed the results of the ultrasound assessment and fetal testing today. I advised the patient that I am having her return for follow-up assessment since she had limited prenatal care. She was asked to count fetal movements and call if she notices any subjective decrease in fetal movement, particularly if she has less than 10 major movements to hours. She is also to call if she has any vaginal bleeding, leaking of amniotic fluid, abdominal pain or tenderness, or any new symptomatology she is concerned about. She is to go directly to labor and delivery for further evaluation if she has any concerns or problems. I answered all of her questions to her satisfaction.  I asked her to call

## 2020-09-29 NOTE — PROGRESS NOTES
Nst completed. Baseline 135  with moderate variabilty+ accelelrations.  irreg ctx noted Reactive per dr Patricia Brooks

## 2020-09-29 NOTE — PROGRESS NOTES
Pt denies bleeding,lof,ctxPt states good fetal movement. Kick counts encouraged. All questions answered+ information confirmed by pt with assistance of kizzy hoganr/mathieu. Nst completed. Baseline 135  with moderate variabilty+ accelelrations.  irreg ctx noted Reactive per dr Bry Paul

## 2020-09-29 NOTE — PATIENT INSTRUCTIONS
Please arrive for your scheduled appointment at least 15 minutes early with your actual insurance card+ a photo ID. Also if you need any refills ordered or have questions, it may take up 48 hours to reply. Please allow ample time for your refills. Call me when you use last refill. Thank you for your cooperation. Any questions contact [place] at [phone]. If you are experiencing an emergency and need immediate help, call 911 or go to go emergency room or labor and delivery. if you are sick, not feeling well or have an infectious process going on please reschedule your appointment by calling [phone]. Also if any family members are not feeling well, please do not bring them to your appointment. We appreciate your cooperation. We are doing this in order to protect our pregnant mothers+ their babies. Call your primary obstetrician with bleeding, leaking of fluid, abdominal tenderness, headache, blurry vision, epigastric pain and increased urinary frequency. Do kick counts after dinner. Call your primary obstetrician if less than 10 kicks in 2 hours after dinner. Call your primary obstetrician with bleeding, leaking of fluid, abdominal tenderness, headache, blurry vision, epigastric pain and increased urinary frequency. You might be having an NST at your next appt. Please eat a large snack or breakfast before coming to office. Thank you  Patient Education        Semana 37 de sandy [address]: Instrucciones de cuidado  Week 37 of Your Pregnancy: Care Instructions  Instrucciones de cuidado    Usted está cerca del final de sandy embarazo, y probablemente esté bastante incómoda. Puede ser más difícil caminar. Acostarse probablemente tampoco sea cómodo. Podría tener dificultad para dormir o para permanecer dormida. La mayoría de las mujeres adin a marco entre las 40 y 43 semanas. Pilar es un buen momento para pensar en preparar un maletín para el hospital con los artículos que necesitará.  Entonces estará lista para cuando comience el trabajo de [place]. La atención de seguimiento es kaden parte clave de sandy tratamiento y seguridad. Asegúrese de hacer y acudir a todas las citas, y llame a sandy médico si está teniendo problemas. También es kaden buena idea saber los resultados de dorcas exámenes y mantener kaden lista de los medicamentos que corey. ¿Cómo puede cuidarse en el hogar? Aprenda sobre el amamantamiento  · El amamantamiento es lo mejor para sandy bebé y Sumi Gravely es mackay para usted. · La leche materna tiene anticuerpos que ayudan al bebé a combatir las infecciones. · Las madres que amamantan suelen bajar de peso más rápidamente, debido a que elaborar leche quema calorías. · Informarse acerca de las mejores maneras de sostener a sandy bebé le facilitará el amamantamiento. · Deje que sandy valentina bañe y [employer] pañales del bebé para que no se sienta excluida. Acurrúquense juntos cuando amamante a sandy bebé. · Es posible que desee aprender a usar un sacaleches y guardar sandy Irma Dibbles. · Si elige alimentar a sandy bebé con biberón, hágalo de la [employer] resulte más parecida al amamantamiento para que pueda establecer un vínculo con sandy bebé. Siempre sostenga al bebé y el biberón. No apoye el biberón ni deje que sandy bebé se quede dormido con él. Aprenda sobre el llanto  · Es normal que los bebés lloren de 1 a 3 horas al día. Algunos lloran más, otros menos. · Los bebés no lloran para causarle molestias ni porque usted sea [address]. · Llorar es la forma de comunicarse que tiene el bebé. Sandy bebé puede [employer] o gases, necesitar un cambio de pañal, sentir frío o calor, sentirse solo o tenso. A veces, los bebés lloran por motivos desconocidos. · Si usted responde a las necesidades de sandy bebé, soledad aprenderá a confiar en usted. · Intente mantener la calma cuando sandy bebé llore. Sandy bebé se puede sentir más molesto si siente que usted José.   Sepa cómo cuidar a sandy recién nacido  · El muñón del cordón umbilical de sandy bebé se caerá solo, por lo general entre las semanas 1 y 2. Para cuidar la hadley del cordón umbilical de sandy bebé:  ? Limpie la hadley de la parte inferior del cordón umbilical 2 o 3 veces al día. ? Ponga especial atención en la hadley en donde el cordón se fija a la piel. ? Mantenga el pañal doblado debajo del cordón. ? Utilice kaden toallita húmeda o algodón para darle un baño de esponja a sandy bebé hasta que se le haya caído el muñón. · La primera evacuación intestinal oscura de sandy bebé se conoce toan meconio. Después del meconio, el bebé tendrá dorcas propios hábitos de evacuación intestinal.  ? Algunos bebés, especialmente aquellos que se alimentan con [address], tienen varias evacuaciones al día. Otros tienen [employer] al día, o kaden cada 2 o 3 días. ? Los bebés que son amamantados a menudo tienen evacuaciones sueltas amarillentas. Los bebés que se alimentan con leche de fórmula evacuan heces más sólidas. ? Si sandy bebé tiene evacuaciones toan bolitas pequeñas, está estreñido. Después de 2 ted de estreñimiento, llame al médico de sandy bebé. · Si sandy bebé va a ser [place], usted puede cuidarlo en el hogar. ? Enjuáguele delicadamente el pene con agua tibia cada vez que le cambie los pañales. No intente retirar la membrana que se forma sobre el pene. Esta membrana desaparecerá por sí tyler. Séquele la hadley con toques suaves de toalla. ? Coloque kaden pomada a base de petróleo, toan vaselina, en la hadley del pañal que tendrá contacto con el pene de sandy bebé. [place] evitará que el pañal se le pegue al bebé. ? Pregúntele al médico acerca de darle acetaminofén (Tylenol) a sandy bebé para el dolor. ¿Dónde puede encontrar más información en inglés? Stephanie Lazaro a [email or web address] e ingrese a sandy cuenta de MyChart. Best Beeess G555 en el Debbora Longs \"Search Health Information\" para más información (en inglés) sobre \"Semana 40 de sandy embarazo: Instrucciones de cuidado. \"     Si no tiene kaden cuenta, ivonne clic en el enlace \"Sign Up Now\".  Revisado: 11 febrero, 1762               DTAQAVT del contenido: 12.5  © 2006-2020 Healthwise, Incorporated. Las instrucciones de cuidado fueron adaptadas bajo licencia por [facility] ([facility]). Si usted tiene [place] afección médica o sobre estas instrucciones, siempre pregunte a sandy profesional de balbir. Healthwise, Incorporated niega toda garantía o responsabilidad por sandy uso de esta información. Patient Education        Gemini Matter a sandy médico marisa el [address] (después de 20 semanas)  Learning About When to Call Your Doctor During Pregnancy (After 20 Weeks)  Instrucciones de cuidado  Es normal que tenga inquietudes acerca de lo que podría ser un problema marisa el [address]. Aunque la mayoría de las mujeres embarazadas no tienen ningún problema grave, es importante saber cuándo llamar a sandy médico si tiene determinados síntomas o señales de trabajo de [place]. Estas son algunas sugerencias generales. Sandy médico puede darle más información sobre cuándo llamar. Cuándo llamar a sandy médico (después de 20 semanas)  Llame al 911 en cualquier momento que considere que necesita atención de [place]. Por ejemplo, llame si:  · Tiene sangrado vaginal intenso. · Tiene dolor repentino e intenso en el abdomen. · Se desmayó (perdió el conocimiento). · Tiene kaden convulsión. · Ve o siente el cordón umbilical.  · Vanessa que está a punto de laurie a marco a sandy bebé y no puede llegar en forma cool al hospital.  Kin Dessert a sandy médico ahora mismo o busque atención médica inmediata si:  · Tiene sangrado vaginal.  · Tiene dolor en el abdomen. · Tiene fiebre. · Tiene síntomas de preeclampsia, toan:  ? Hinchazón repentina de la keith, las papito o los pies. ? Nuevos problemas de visión (toan oscurecimiento, ameya borroso o ameya puntos). ? Dolor de everett intenso. · Tiene kaden pérdida repentina de líquido por la vagina. (Piensa que rompió la clarke). · Piensa que puede keiry comenzado el [place] de [place].  [place] significa que ha tenido al menos 6 contracciones en Alexia Adkins. · Nota que sandy bebé ha dejado de moverse o lo hace mucho menos de lo habitual.  · Tiene síntomas de kaden infección urinaria. Estos pueden incluir:  ? Dolor o ardor al orinar. ? Necesidad de orinar con frecuencia sin poder eliminar mucha orina. ? Dolor en el flanco, que se encuentra mike debajo de la caja torácica y [place] de la cintura en un lado de la espalda. ? [employer]. Preste especial atención a los cambios en sandy balbir y asegúrese de comunicarse con sandy médico si:  · Tiene flujo vaginal con un olor desagradable. · Tiene cambios en la piel, tales toan:  ? Salpullido. ? Comezón. ? Color amarillento en la piel. · Tiene otras inquietudes acerca de sandy embarazo. Si tiene signos de trabajo de parto al llegar a las 37 [address] o más  Si tiene señales de [place] de parto a las 37 semanas o [place], es posible que sandy médico le diga que llame cuando sandy trabajo de parto se vuelva más [place]. Los síntomas del trabajo de parto activo incluyen:  · Contracciones que son regulares. · Contracciones a intervalos de menos de 5 minutos. · Contracciones marisa las cuales es difícil hablar. La atención de seguimiento es kaden parte clave de sandy tratamiento y seguridad. Asegúrese de hacer y acudir a todas las citas, y llame a sandy médico si está teniendo problemas. También es kaden buena idea saber los resultados de dorcas exámenes y mantener kaden lista de los medicamentos que corey. ¿Dónde puede encontrar más información en inglés? Kym Dove a [email or web address] e ingrese a sandy cuenta de MyChart. Ervin Rusty O474 en el [place] \"Search Health Information\" para más información (en inglés) sobre \"Aprenda cuándo llamar a sandy médico marisa el embarazo (después de 20 semanas). \"     Si no tiene kaden cuenta, ivonne jairo en el enlace \"Sign Up Now\". Revisado: 11 febrero, 2020               Versión del contenido: 12.5  © 7876-1847 Healthwise, Incorporated.    Shabbir Anguiano instrucciones de cuidado fueron adaptadas bajo licencia por [facility] ([facility]). Si usted tiene [place] afección médica o sobre estas instrucciones, siempre pregunte a sandy profesional de balbir. [facility] niega toda garantía o responsabilidad por sandy uso de esta información. Patient Education        Conteo de las patadas de sandy bebé: Instrucciones de cuidado  [employer] Your Baby's Kicks: Care Instructions  Instrucciones de cuidado    Contar las patadas de sandy bebé es kaden manera en la que sandy médico puede establecer si sandy bebé es saludable. La mayoría de las [facility], sobre todo en el primer embarazo, siente que sandy bebé se mueve por primera vez entre las semanas 12 y 25. El movimiento puede sentirse más toan aleteos que toan patadas. Es posible que sandy bebé se mueva más a ciertas horas del día. Cuando usted [place], puede notar menos patadas que cuando está descansando. En dorcas visitas prenatales, sandy médico le preguntará si el bebé está activo. En el último trimestre, sandy médico le puede pedir que cuente la cantidad de veces que sienta que el bebé se [place]. La atención de seguimiento es kaden parte clave de sandy tratamiento y seguridad. Asegúrese de hacer y acudir a todas las citas, y llame a sandy médico si está teniendo problemas. También es kaden buena idea saber los resultados de dorcas exámenes y mantener kaden lista de los medicamentos que corey. ¿Cómo se cuentan las patadas fetales? · Un método común para revisar el movimiento de sandy bebé es contar la cantidad de patadas o movimientos que sienta en 1 hora. Es normal sentir 10 movimientos (toan patadas, aleteos o vueltas) en 1 hora. Algunos médicos sugieren que cuente marisa la [facility] llegar a los 10 movimientos. Luego usted puede dejar de contar por sabas día y comenzar otra vez al día siguiente. · Para contar, elija el momento del día en que sandy bebé esté más activo. Podría ser cualquier momento entre la mañana y la noche.   · Si no siente 10 movimientos en kaden hora, es posible que sandy bebé esté durmiendo. Espere hasta la próxima hora y vuelva a contar. ¿Cuándo debe pedir ayuda? Llame a sandy médico ahora mismo o busque atención médica inmediata si:  · Siente que sandy bebé ha dejado de moverse o se mueve mucho menos de lo normal.  Preste especial atención a los cambios en sandy balbir y asegúrese de comunicarse con sandy médico si tiene cualquier problema. ¿Dónde puede encontrar más información en inglés? Thena Fetch a [email or web address] e ingrese a sandy cuenta de MyChartShania Bell [place] N040 en el [place] \"Search Health Information\" para más información (en inglés) sobre \"Conteo de las patadas de sandy bebé: Instrucciones de cuidado. \"     Si no tiene kaden cuenta, ivonne jairo en el enlace \"Sign Up Now\". Revisado: 11 febrero, 2020               Versión del contenido: 12.5  © 6492-2216 Healthwise, Incorporated. Las instrucciones de cuidado fueron adaptadas bajo licencia por [facility] ([facility]). Si usted tiene [place] afección médica o sobre estas instrucciones, siempre pregunte a sandy profesional de balbir. Healthwise, Incorporated niega toda garantía o responsabilidad por sandy uso de esta información.

## 2020-10-01 ENCOUNTER — ROUTINE PRENATAL (OUTPATIENT)
Dept: OBGYN | Age: 20
End: 2020-10-01

## 2020-10-01 VITALS — SYSTOLIC BLOOD PRESSURE: 91 MMHG | WEIGHT: 142 LBS | DIASTOLIC BLOOD PRESSURE: 64 MMHG | HEART RATE: 69 BPM

## 2020-10-01 LAB
GLUCOSE URINE, POC: NORMAL
PROTEIN UA: NEGATIVE

## 2020-10-01 PROCEDURE — 99212 OFFICE O/P EST SF 10 MIN: CPT | Performed by: OBSTETRICS & GYNECOLOGY

## 2020-10-01 PROCEDURE — 99213 OFFICE O/P EST LOW 20 MIN: CPT | Performed by: OBSTETRICS & GYNECOLOGY

## 2020-10-01 PROCEDURE — 81002 URINALYSIS NONAUTO W/O SCOPE: CPT | Performed by: OBSTETRICS & GYNECOLOGY

## 2020-10-01 NOTE — PROGRESS NOTES
Here for Centering pregnancy, routine prenatal.   patient. Kamini Garner here to translate today. GBS negative. Voices no complaints. Good FM, no LOF or VB. No cramping. .  Now 37+ weeks. RTC 1 week for prenatal/Centering. Labor warnings given.

## 2020-10-01 NOTE — PROGRESS NOTES
Patient is here today for prenatal visit, patient has no issues today. [facility] was used to interpret today's visit.

## 2020-10-06 ENCOUNTER — HOSPITAL ENCOUNTER (INPATIENT)
Age: 20
LOS: 4 days | Discharge: HOME | DRG: 560 | End: 2020-10-10
Attending: OBSTETRICS & GYNECOLOGY | Admitting: OBSTETRICS & GYNECOLOGY
Payer: MEDICAID

## 2020-10-06 ENCOUNTER — ROUTINE PRENATAL (OUTPATIENT)
Dept: OBGYN CLINIC | Age: 20
End: 2020-10-06
Payer: MEDICAID

## 2020-10-06 VITALS — WEIGHT: 145 LBS | DIASTOLIC BLOOD PRESSURE: 77 MMHG | SYSTOLIC BLOOD PRESSURE: 119 MMHG | HEART RATE: 74 BPM

## 2020-10-06 PROBLEM — R94.8 ABNORMAL PLACENTA FUNCTION TEST: Status: ACTIVE | Noted: 2020-10-06

## 2020-10-06 PROBLEM — O41.03X0 OLIGOHYDRAMNIOS IN THIRD TRIMESTER: Status: ACTIVE | Noted: 2020-10-06

## 2020-10-06 PROBLEM — Z3A.38 38 WEEKS GESTATION OF PREGNANCY: Status: ACTIVE | Noted: 2020-10-06

## 2020-10-06 LAB
ABO/RH: NORMAL
AMNISURE, POC: NEGATIVE
AMPHETAMINE SCREEN, URINE: NOT DETECTED
ANTIBODY SCREEN: NORMAL
BARBITURATE SCREEN URINE: NOT DETECTED
BENZODIAZEPINE SCREEN, URINE: NOT DETECTED
CANNABINOID SCREEN URINE: NOT DETECTED
COCAINE METABOLITE SCREEN URINE: NOT DETECTED
FENTANYL SCREEN, URINE: NOT DETECTED
GLUCOSE URINE, POC: NORMAL
HCT VFR BLD CALC: 39.7 % (ref 34–48)
HEMOGLOBIN: 13.5 G/DL (ref 11.5–15.5)
Lab: NORMAL
Lab: NORMAL
MCH RBC QN AUTO: 31 PG (ref 26–35)
MCHC RBC AUTO-ENTMCNC: 34 % (ref 32–34.5)
MCV RBC AUTO: 91.1 FL (ref 80–99.9)
METHADONE SCREEN, URINE: NOT DETECTED
NEGATIVE QC PASS/FAIL: NORMAL
OPIATE SCREEN URINE: NOT DETECTED
OXYCODONE URINE: NOT DETECTED
PDW BLD-RTO: 12.2 FL (ref 11.5–15)
PHENCYCLIDINE SCREEN URINE: NOT DETECTED
PLATELET # BLD: 194 E9/L (ref 130–450)
PMV BLD AUTO: 11.4 FL (ref 7–12)
POSITIVE QC PASS/FAIL: NORMAL
PROTEIN UA: NEGATIVE
RBC # BLD: 4.36 E12/L (ref 3.5–5.5)
WBC # BLD: 7.8 E9/L (ref 4.5–11.5)

## 2020-10-06 PROCEDURE — 99213 OFFICE O/P EST LOW 20 MIN: CPT | Performed by: OBSTETRICS & GYNECOLOGY

## 2020-10-06 PROCEDURE — 81002 URINALYSIS NONAUTO W/O SCOPE: CPT | Performed by: OBSTETRICS & GYNECOLOGY

## 2020-10-06 PROCEDURE — 80307 DRUG TEST PRSMV CHEM ANLYZR: CPT

## 2020-10-06 PROCEDURE — 76820 UMBILICAL ARTERY ECHO: CPT | Performed by: OBSTETRICS & GYNECOLOGY

## 2020-10-06 PROCEDURE — 6370000000 HC RX 637 (ALT 250 FOR IP): Performed by: MIDWIFE

## 2020-10-06 PROCEDURE — 85027 COMPLETE CBC AUTOMATED: CPT

## 2020-10-06 PROCEDURE — 76819 FETAL BIOPHYS PROFIL W/O NST: CPT | Performed by: OBSTETRICS & GYNECOLOGY

## 2020-10-06 PROCEDURE — 76816 OB US FOLLOW-UP PER FETUS: CPT | Performed by: OBSTETRICS & GYNECOLOGY

## 2020-10-06 PROCEDURE — 36415 COLL VENOUS BLD VENIPUNCTURE: CPT

## 2020-10-06 PROCEDURE — 86900 BLOOD TYPING SEROLOGIC ABO: CPT

## 2020-10-06 PROCEDURE — 86850 RBC ANTIBODY SCREEN: CPT

## 2020-10-06 PROCEDURE — 2580000003 HC RX 258: Performed by: MIDWIFE

## 2020-10-06 PROCEDURE — 3E0P7VZ INTRODUCTION OF HORMONE INTO FEMALE REPRODUCTIVE, VIA NATURAL OR ARTIFICIAL OPENING: ICD-10-PCS | Performed by: OBSTETRICS & GYNECOLOGY

## 2020-10-06 PROCEDURE — 1220000001 HC SEMI PRIVATE L&D R&B

## 2020-10-06 PROCEDURE — 86901 BLOOD TYPING SEROLOGIC RH(D): CPT

## 2020-10-06 RX ORDER — DOCUSATE SODIUM 100 MG/1
100 CAPSULE, LIQUID FILLED ORAL 2 TIMES DAILY
Status: DISCONTINUED | OUTPATIENT
Start: 2020-10-06 | End: 2020-10-08

## 2020-10-06 RX ORDER — SODIUM CHLORIDE 0.9 % (FLUSH) 0.9 %
10 SYRINGE (ML) INJECTION PRN
Status: DISCONTINUED | OUTPATIENT
Start: 2020-10-06 | End: 2020-10-08

## 2020-10-06 RX ORDER — SODIUM CHLORIDE 0.9 % (FLUSH) 0.9 %
10 SYRINGE (ML) INJECTION EVERY 12 HOURS SCHEDULED
Status: DISCONTINUED | OUTPATIENT
Start: 2020-10-06 | End: 2020-10-08

## 2020-10-06 RX ORDER — SODIUM CHLORIDE, SODIUM LACTATE, POTASSIUM CHLORIDE, CALCIUM CHLORIDE 600; 310; 30; 20 MG/100ML; MG/100ML; MG/100ML; MG/100ML
INJECTION, SOLUTION INTRAVENOUS CONTINUOUS
Status: DISCONTINUED | OUTPATIENT
Start: 2020-10-06 | End: 2020-10-08

## 2020-10-06 RX ORDER — ONDANSETRON 2 MG/ML
4 INJECTION INTRAMUSCULAR; INTRAVENOUS EVERY 6 HOURS PRN
Status: DISCONTINUED | OUTPATIENT
Start: 2020-10-06 | End: 2020-10-08

## 2020-10-06 RX ADMIN — SODIUM CHLORIDE, POTASSIUM CHLORIDE, SODIUM LACTATE AND CALCIUM CHLORIDE 125 ML/HR: 600; 310; 30; 20 INJECTION, SOLUTION INTRAVENOUS at 16:41

## 2020-10-06 RX ADMIN — Medication 25 MCG: at 16:45

## 2020-10-06 RX ADMIN — Medication 25 MCG: at 22:28

## 2020-10-06 ASSESSMENT — PAIN DESCRIPTION - DESCRIPTORS: DESCRIPTORS: CRAMPING

## 2020-10-06 NOTE — PATIENT INSTRUCTIONS
birth means that your baby is born through a cut (incision) in your lower belly. It is sometimes the best choice for the health of the baby and the mother. This care sheet can help you understand  births. It also gives you information about what to expect after your baby is born. And it helps you understand more about postpartum depression. Follow-up care is a key part of your treatment and safety. Be sure to make and go to all appointments, and call your doctor if you are having problems. It's also a good idea to know your test results and keep a list of the medicines you take. How can you care for yourself at home? Learn about  birth  · Most C-sections are unplanned. They are done because of problems that occur during labor. These problems might include:  ? Labor that slows or stops. ? High blood pressure or other problems for the mother. ? Signs of distress in the baby. These signs may include a very fast or slow heart rate. · Although most mothers and babies do well after , it is major surgery. It has more risks than a vaginal delivery. · In some cases, a planned  may be safer than a vaginal delivery. This may be the case if:  ? The mother has a health problem, such as a heart condition. ? The baby isn't in a head-down position for delivery. This is called a breech position. ? The uterus has scars from past surgeries. This could increase the chance of a tear in the uterus. ? There is a problem with the placenta. ? The mother has an infection, such as genital herpes, that could be spread to the baby. ? The mother is having twins or more. ? The baby weighs 9 to 10 pounds or more. · Because of the risks of , planned C-sections generally should be done only for medical reasons. And a planned  should be done at 39 weeks or later unless there is a medical reason to do it sooner.   Know what to expect after delivery, and plan for the first few weeks at home  · You, your baby, and your partner or  will get identification bands. Only people with matching bands can  the baby from the nursery. · You will learn how to feed, diaper, and bathe your baby. And you will learn how to care for the umbilical cord stump. If your baby will be circumcised, you will also learn how to care for that. · Ask people to wait to visit you until you are at home. And ask them to wash their hands before they touch your baby. · Make sure you have another adult in your home for at least 2 or 3 days after the birth. · During the first 2 weeks, limit when friends and family can visit. · Do not allow visitors who have colds or infections. Make sure all visitors are up to date with their vaccinations. Never let anyone smoke around your baby. · Try to nap when the baby naps. Be aware of postpartum depression  · \"Baby blues\" are common for the first 1 to 2 weeks after birth. You may cry or feel sad or irritable for no reason. · For some women, these feelings last longer and are more intense. This is called postpartum depression. · If your symptoms last for more than a few weeks or you feel very depressed, ask your doctor for help. · Postpartum depression can be treated. Support groups and counseling can help. Sometimes medicine can also help. Where can you learn more? Go to https://siOPTICAlesleiSamba TV.Responsible City. org and sign in to your [employer] account. Enter B044 in the [facility] box to learn more about \"Week 38 of Your Pregnancy: Care Instructions. \"     If you do not have an account, please click on the \"Sign Up Now\" link. Current as of: February 11, 2020               Content Version: 12.5  © 5936-6075 Healthwise, Incorporated. Care instructions adapted under license by [facility] ([facility]).  If you have questions about a medical condition or this instruction, always ask your healthcare professional. Zion Feng disclaims any warranty or liability for your use of this information. Patient Education        Learning About When to Call Your Doctor During Pregnancy (After 20 Weeks)  Your Care Instructions  It's common to have concerns about what might be a problem during pregnancy. Although most pregnant women don't have any serious problems, it's important to know when to call your doctor if you have certain symptoms or signs of labor. These are general suggestions. Your doctor may give you some more information about when to call. When to call your doctor (after 20 weeks)  Call 911 anytime you think you may need emergency care. For example, call if:  · You have severe vaginal bleeding. · You have sudden, severe pain in your belly. · You passed out (lost consciousness). · You have a seizure. · You see or feel the umbilical cord. · You think you are about to deliver your baby and can't make it safely to the hospital.  Call your doctor now or seek immediate medical care if:  · You have vaginal bleeding. · You have belly pain. · You have a fever. · You have symptoms of preeclampsia, such as:  ? Sudden swelling of your face, hands, or feet. ? New vision problems (such as dimness, blurring, or seeing spots). ? A severe headache. · You have a sudden release of fluid from your vagina. (You think your water broke.)  · You think that you may be in labor. This means that you've had at least 6 contractions in an hour. · You notice that your baby has stopped moving or is moving much less than normal.  · You have symptoms of a urinary tract infection. These may include:  ? Pain or burning when you urinate. ? A frequent need to urinate without being able to pass much urine. ? Pain in the flank, which is just below the rib cage and above the waist on either side of the back. ? Blood in your urine. Watch closely for changes in your health, and be sure to contact your doctor if:  · You have vaginal discharge that smells bad.   · You have skin changes, such as:  ? A rash. ? Itching. ? Yellow color to your skin. · You have other concerns about your pregnancy. If you have labor signs at 37 weeks or more  If you have signs of labor at 37 weeks or more, your doctor may tell you to call when your labor becomes more active. Symptoms of active labor include:  · Contractions that are regular. · Contractions that are less than 5 minutes apart. · Contractions that are hard to talk through. Follow-up care is a key part of your treatment and safety. Be sure to make and go to all appointments, and call your doctor if you are having problems. It's also a good idea to know your test results and keep a list of the medicines you take. Where can you learn more? Go to https://Devex.iContact. org and sign in to your [employer] account. Enter  in the [employer] box to learn more about \"Learning About When to Call Your Doctor During Pregnancy (After 20 Weeks). \"     If you do not have an account, please click on the \"Sign Up Now\" link. Current as of: February 11, 2020               Content Version: 12.5  © 9749-0617 [employer]. Care instructions adapted under license by [facility] ([facility]). If you have questions about a medical condition or this instruction, always ask your healthcare professional. [place] [address] any warranty or liability for your use of this information. Patient Education        Luc Dan a sandy médico marisa el [address] (después de 20 semanas)  Learning About When to Call Your Doctor During Pregnancy (After 20 Weeks)  Instrucciones de cuidado  Es normal que tenga inquietudes acerca de lo que podría ser un problema marisa el [address]. Aunque la mayoría de las mujeres embarazadas no tienen ningún problema grave, es importante saber cuándo llamar a sandy médico si tiene determinados síntomas o señales de trabajo de [place]. Estas son algunas sugerencias generales.  Sandy médico puede darle más información sobre cuándo llamar. Cuándo llamar a sandy médico (después de 20 semanas)  Llame al 911 en cualquier momento que considere que necesita atención de [place]. Por ejemplo, llame si:  · Tiene sangrado vaginal intenso. · Tiene dolor repentino e intenso en el abdomen. · Se desmayó (perdió el conocimiento). · Tiene kaden convulsión. · Ve o siente el cordón umbilical.  · Vanessa que está a punto de laurie a marco a sandy bebé y no puede llegar en forma cool al hospital.  Isidor  a sandy médico ahora mismo o busque atención médica inmediata si:  · Tiene sangrado vaginal.  · Tiene dolor en el abdomen. · Tiene fiebre. · Tiene síntomas de preeclampsia, toan:  ? Hinchazón repentina de la keith, las papito o los pies. ? Nuevos problemas de visión (toan oscurecimiento, ameya borroso o ameya puntos). ? Dolor de everett intenso. · Tiene kaden pérdida repentina de líquido por la vagina. (Piensa que rompió la clarke). · Piensa que puede keiry comenzado el [place]. [place] significa que ha tenido al menos 6 contracciones en [employer]. · Nota que sandy bebé ha dejado de moverse o lo hace mucho menos de lo habitual.  · Tiene síntomas de kaden infección urinaria. Estos pueden incluir:  ? Dolor o ardor al orinar. ? Necesidad de orinar con frecuencia sin poder eliminar mucha orina. ? Dolor en el flanco, que se encuentra mike debajo de la caja torácica y [place] de la cintura en un lado de la espalda. ? [employer]. Preste especial atención a los cambios en sandy balbir y asegúrese de comunicarse con sandy médico si:  · Tiene flujo vaginal con un olor desagradable. · Tiene cambios en la piel, tales toan:  ? Salpullido. ? Comezón. ? Color amarillento en la piel. · Tiene otras inquietudes acerca de sandy embarazo.   Si tiene signos de trabajo de parto al llegar a las [address] o más  Si tiene señales de [place] de parto a las 37 semanas o [place], es posible que sandy médico le diga que llame cuando sandy trabajo de parto se Fausto Passer activo. Los síntomas del trabajo de parto activo incluyen:  · Contracciones que son regulares. · Contracciones a intervalos de menos de 5 minutos. · Contracciones marisa las cuales es difícil hablar. La atención de seguimiento es kaden parte clave de sandy tratamiento y seguridad. Asegúrese de hacer y acudir a todas las citas, y llame a sandy médico si está teniendo problemas. También es kaden buena idea saber los resultados de dorcas exámenes y mantener kaden lista de los medicamentos que corey. ¿Dónde puede encontrar más información en inglés? Eve Sabal a [email or web address] e ingrese a sandy cuenta de MyChart. Indu De La Cruz S845 en el Clarine Bustamante \"Search Health Information\" para más información (en inglés) sobre \"Aprenda cuándo llamar a sandy médico marisa el embarazo (después de 20 semanas). \"     Si no tiene kaden cuenta, ivonne jairo en el enlace \"Sign Up Now\". Revisado: 11 febrero, 2020               Versión del contenido: 12.5  © 2740-4587 Healthwise, Incorporated. Las instrucciones de cuidado fueron adaptadas bajo licencia por [facility] ([facility]). Si usted tiene [place] afección médica o sobre estas instrucciones, siempre pregunte a sandy profesional de balbir. Healthwise, Incorporated niega toda garantía o responsabilidad por sandy uso de esta información. Patient Education        Semana 45 de sandy embarazo: Instrucciones de cuidado  Week 38 of Your Pregnancy: Care Instructions  Instrucciones de cuidado    Aunque no lo crea, sandy bebé está por nacer. Es posible que ya tenga ideas acerca de la personalidad de sandy bebé debido a cuánto se mueve. O geri vez haya notado cómo responde a los sonidos, al calor, al frío y a la marco. Incluso podría saber qué tipo de [place] [place] a sandy bebé. A estas [place], usted tiene [address] idea de qué puede esperar marisa el [place].  Es posible que haya hablado de dorcas preferencias del nacimiento con sandy médico. [place] incluso si usted quiere un nacimiento por vía vaginal, es kaden buena idea aprender [employer] nacimientos por cesárea. Los partos por cesárea son Cristian [employer] bebés nacen por medio de un bel (incisión) hecho en la parte inferior del abdomen. A veces, es la mejor opción para la balbir del bebé y de [place]. Esta hoja de cuidados puede ayudarla a entender los nacimientos por cesárea. También le da información sobre qué esperar después del nacimiento de sandy bebé. Y la ayuda a comprender [employer] depresión posparto. La atención de seguimiento es kaden parte clave de sandy tratamiento y seguridad. Asegúrese de hacer y acudir a todas las citas, y llame a sandy médico si está teniendo problemas. También es kaden buena idea saber los resultados de dorcas exámenes y mantener kaden lista de los medicamentos que corey. ¿Cómo puede cuidarse en el hogar? Aprenda sobre el parto por cesárea  · La mayoría de los partos por cesárea no están planeados. Se hacen por problemas que se producen marisa el trabajo de [place]. Estos problemas podrían incluir:  ? El [place] de parto se vuelve más lento o se detiene. ? Presión arterial august u otros problemas para la madre.  ? Señales de sufrimiento del bebé. Estas señales pueden incluir kaden frecuencia cardíaca muy rápida o lenta. · [place] y los bebés están aletha después de un parto por cesárea, la cesárea es kaden Eulah Carota. Tiene más riesgos que un parto vaginal.  · En algunos casos, un parto por cesárea planificado puede ser más seguro que un parto vaginal. [place] puede ser el aydee si:  ? La madre tiene un problema de balbir, toan kaden afección cardíaca. ? El bebé no está en posición con la everett para abajo para el parto. Esta posición se llama de nalgas. ? El útero tiene cicatrices de cirugías anteriores. [place] podría aumentar la probabilidad de un desgarro en el Rehan Kennel. ? Hay un problema con la placenta. ? La madre tiene kaden infección, toan herpes genital, que podría transmitirse al bebé.   ? La madre está embarazada con mellizos o más bebés. ? El bebé pesa de 9 a 8 libras o más. · Debido a los riesgos del parto por cesárea, las cesáreas planeadas deberían hacerse generalmente solo por motivos médicos. Y kaden cesárea planeada debería hacerse en la semana 44 o más tarde miguel que haya un motivo médico para hacerla antes. Sepa lo que ocurrirá después del parto y cómo planificar las primeras semanas en sandy hogar  · Usted, sandy bebé y sandy valentina o instructor Nestor Mar bandas de identificación. Solo las personas que tengan bandas que coincidan podrán retirar al bebé de la shay de recién nacidos. · Aprenderá a alimentar a sandy bebé, cambiar el pañal y bañar al bebé. Miachel Million a cuidar del muñón del cordón umbilical. Si Camille Algoma a circuncidar a sandy bebé, también aprenderá a cuidar kaden circuncisión. · Pídale a las visitas que esperen a visitarla cuando esté en sandy hogar. Y pídales que se laven las papito antes de tocar al bebé. · Asegúrese de tener otro adulto en casa por lo menos 2 o 3 días después del Edy[place]. · Jocelynn las primeras 2 semanas, limite las visitas de familiares y amigos. · No permita visitantes que tengan resfriados o infecciones. Asegúrese de que todos los visitantes estén al día con dorcas vacunas. Nunca deje que nadie fume cerca de sandy bebé. · Trate de dormir kaden siesta cuando sandy bebé duerma. Sea consciente de la depresión posparto  · La \"melancolía de la maternidad\" es común en las primeras 1 o 2 semanas después del [place]. Es posible que tenga ganas de llorar o se sienta tashi o irritable sin motivo alguno. · En algunas mujeres, estas emociones young más tiempo y son más intensas. A esto se lo conoce toan depresión posparto. · Si dorcas síntomas young más de unas pocas semanas, o si se siente muy deprimida, pídale ayuda a sandy médico.  · La depresión posparto sí puede tratarse. Los grupos de apoyo y la asesoría psicológica pueden ser de [place]. A veces, los medicamentos también pueden ayudar.   ¿Dónde puede encontrar más información en sienta en 1 hora. Es normal sentir 10 movimientos (toan patadas, aleteos o vueltas) en 1 hora. Algunos médicos sugieren que cuente marisa la [facility] llegar a los 10 movimientos. Luego usted puede dejar de contar por sabas día y comenzar otra vez al día siguiente. · Para contar, elija el momento del día en que sandy bebé esté más activo. Podría ser cualquier momento entre la mañana y la noche. · Si no siente 10 movimientos en kaden hora, es posible que sandy bebé esté durmiendo. Espere hasta la próxima hora y vuelva a contar. ¿Cuándo debe pedir ayuda? Llame a sandy médico ahora mismo o busque atención médica inmediata si:  · Siente que sandy bebé ha dejado de moverse o se mueve mucho menos de lo normal.  Preste especial atención a los cambios en sandy balbir y asegúrese de comunicarse con sandy médico si tiene cualquier problema. ¿Dónde puede encontrar más información en [facility]? Karla Lauren a [email or web address] e ingrese a sandy cuenta de MyChart. Divya Jerez J477 en el [place] \"Search Health Information\" para más información (en inglés) sobre \"Conteo de las patadas de sandy bebé: Instrucciones de cuidado. \"     Si no tiene kaden cuenta, ivonne jairo en el enlace \"Sign Up Now\". Revisado: 11 febrero, 2020               Versión del contenido: 12.5  © 2532-0131 Healthwise, Incorporated. Las instrucciones de cuidado fueron adaptadas bajo licencia por [facility] ([facility]). Si usted tiene [place] afección médica o sobre estas instrucciones, siempre pregunte a sandy profesional de balbir. [place], Incorporated niega toda garantía o responsabilidad por sandy uso de esta información.

## 2020-10-06 NOTE — LETTER
10/6/20    Eulalio Nelson MD  [address], [address] [place] [address]                RE:  Anahy Yao  : 2000   AGE: 21 y.o.     This report has been created using voice recognition software. It may contain errors which are inherent in voice recognition technology.     Dear Dr. Jb Couch:  Chandan Myers had an appointment today for the following indications:     Patient Active Problem List   Diagnosis    Small stature    Short femur of fetus on prenatal ultrasound      [facility] Nadeem Lockhart is a 21 y.o. female, who is G1(0). She has an Estimated Date of Delivery: 10/19/20 based on her LMP and previous ultrasound assessment. She is currently 38 weeks 1 days gestation based on that assessment.      The patient does not speak [place].  Ms Chance Angelo was present during her evaluation.       The patient has no complaints. She states that her fetal movement has been good. She has had no increased vaginal discharge, vaginal bleeding, back pain, dysuria, hematuria, or leaking of amniotic fluid.     A fetal ultrasound assessment was performed today. The estimated fetal weight is at the 17th%. The ABDELRAHMAN is 5.1 cm. The placenta is anterior and grade 3. The BPP was scored 4/8. The cord Doppler SD ratios were elevated without AREDF.       The mother and father to the baby are both of small stature.   The small fetal size is most likely a familial variation.                         GENETIC SCREENING/TERATOLOGY COUNSELING                      (Includes patient, FTB, and any affected family members)     Patient Age > 35 Years NO   Thalassemia ( MVC<80) NO   Congential Heart Defect NO   Neural Tube Defect NO   Evgeny-Sachs NO   Sickle Cell Disease NO   Sickle Cell Trait NO   Sickle C Disease or Trait NO   Hemophilia NO   Muscular Dystrophy NO   Cystic Fibrosis NO   Tift Disease NO   Autism NO   Mental Retardation NO   History of Fragile X NO   Maternal Diabetes NO MUSCULOSKELETAL : No myalgia, No back pain  NEUROLOGICAL : No numbness, no tingling, no tremors. No history of seizures  ALL OTHER SYSTEMS WERE REPORTED AS NEGATIVE.     PERTINENT PHYSICAL EXAMINATION:   /77   Pulse 74   Wt 145 lb (65.8 kg)   LMP 01/13/2020 (Approximate)   Urine dipstick:   Negative for Glucose    Negative for Albumin     GENERAL:   The patient is a well developed, female who is alert cooperative and oriented times three in no acute distress.     HEENT:  Normo cephalic and atraumatic. No facial edema.      ABDOMEN:   Her uterus is gravid. She had no complaint of abdominal pain or tenderness. The fetal heart rate is 141 bpm. The fetus is in the cephalic presentation which was confirmed by the ultrasound assessment.     EXTREMITIES:  No peripheral edema is noted.      A fetal ultrasound assessment was performed today. A report is enclosed for your review.     IMPRESSION:  1.  IUP at 38 weeks 1 days gestation based on her Estimated Date of Delivery: 10/19/20  2. Insufficient prenatal care   3. Asymptomatic uterine contractions   4. Short fetal femur (mother and father are of short stature)  5. EFW 17th%  6. BPP 4/8 with elevated cord Doppler SD ratios without AREDF. 7.  GBS negative     PLAN:  I discussed the patient with Dr. Renée Rodriguez who was covering the service at the time of the patient's assessment. The patient was sent to labor and delivery for further evaluation and management secondary to the finding of oligohydramnios with a biophysical profile score of 4 of 8 and elevated cord Doppler SD ratios. The patient had late prenatal care and does not have well established dating parameters. I would recommend administering Celestone for acceleration of fetal organ maturity. If the fetal heart rate tracing is reassuring with moderate variability and accelerations, repeat fetal testing will be performed on October 7, 2020.   If the fetal heart rate tracing is not reassuring the patient should be delivered. I spent 25 minutes of direct contact time with the patient of which greater than 50% of the time was to discuss complications and problems related to her pregnancy. This included my telephone conversation with Dr. Clarine Lesch to discuss the results of the patient's testing and my recommendations. I discussed the results of the ultrasound assessment and fetal testing today. She is to go directly to labor and delivery for further evaluation. I answered all of her questions to her satisfaction.  I asked her to call if she had any additional questions prior to her next visit.       If you have any questions regarding her management, please contact me at your convenience and thank you for allowing me to participate in her care.     Sincerely,           Dollie Aase, MD, [address], [facility], [address] [facility],  [facility]  Director [address] [place] [address]  [phone]

## 2020-10-06 NOTE — H&P
Department of Obstetrics and Gynecology   Obstetrics History and Physical      CHIEF COMPLAINT:  Induction of Labor, SGA, Decreased FM    HISTORY OF PRESENT ILLNESS:      The patient is a 21 y.o.  at 38 weeks 1 days  Patient presents with a chief complaint as above and is being admitted from [facility] for recommendation for delivery for Oligohydramnios,  SGA and Decreased FM. Late PNC, Pt does not speak English , interpretor in room. EFW 5 lbs 5 oz 16% at 37 weeks    DATES:    Patient's last menstrual period was 2020 (approximate). Estimated Date of Delivery: 10/19/20    PRENATAL CARE:    Provider:  Mary    Blood Type/Rh:  O Positive  Hepatitis B Surface Antigen: Nonreactive  Gonorrhea:  Negative  Chlamydia:  Negative  Group B Strep:  Negative      PAST OB HISTORY        Depression:  No      Post-partum depression:  No      Diabetes:  No      Gestational Diabetes:  No      Thyroid Disease:  No      Chronic HTN:  No      Gestation HTN:  No      Pre-eclampsia:  No      Seizure disorder:  No      Asthma:  No      Clotting disorder:  No      :  No      Tubal ligation:  No      D & C:  No      Cerclage:  No      LEEP:  No      Myomectomy:  No    OB History    Para Term  AB Living   1             SAB TAB Ectopic Molar Multiple Live Births                    # Outcome Date GA Lbr Navneet/2nd Weight Sex Delivery Anes PTL Lv   1 Current               Obstetric Comments   Used  #[id number]. Patient very poor historian. Past Medical History:    No past medical history on file. Past Surgical History:    No past surgical history on file. Social History:    Denies smoking, drugs or alcohol use  Family History:   No family history on file.   Medications Prior to Admission:  Medications Prior to Admission: Prenatal Vit-Fe Fumarate-FA (PRENATAL VITAMINS) 28-0.8 MG TABS, Take 1 tablet by mouth daily Indications: samples  Allergies:  Patient has no known allergies. PHYSICAL EXAM:    VITALS:  /80   Pulse 88   Temp 98.3 °F (36.8 °C) (Oral)   Resp 16   Ht 4' 10\" (1.473 m)   Wt 145 lb (65.8 kg)   LMP 01/13/2020 (Approximate)   BMI 30.31 kg/m²       General appearance:  awake, alert, cooperative, no apparent distress, and appears stated age  Neurologic:  Awake, alert, oriented to name, place and time.     Lungs:  No increased work of breathing, good air exchange, clear to auscultation bilaterally, no crackles or wheezing  Heart:  Normal apical impulse, regular rate and rhythm  Abdomen:  Gravid, soft, nontender  Fetal heart rate:  Baseline Heart Rate 140, accelerations:  present  long term variability:  moderate  decelerations:  absent  Pelvis:  External Genitalia: General appearance; normal, Hair distribution; normal, Lesions absent  Vagina: Pelvic support normal  Cervix:    DILATION:  FT  EFFACEMENT:   Long  STATION:  -3 cm  CONSISTENCY:  soft  POSITION:  posterior    PRESENTATION: cephallic      Contraction frequency:  irregular  Membranes:  Intact, amnisure negative      ASSESSMENT AND PLAN:  Discussed with Dr. Kamala Bradley patient  Induction of Labor, Cytotec  Clear liquid diet    Ortega Collado CNM

## 2020-10-07 ENCOUNTER — ANESTHESIA (OUTPATIENT)
Dept: LABOR AND DELIVERY | Age: 20
End: 2020-10-07

## 2020-10-07 ENCOUNTER — ANESTHESIA EVENT (OUTPATIENT)
Dept: LABOR AND DELIVERY | Age: 20
End: 2020-10-07

## 2020-10-07 PROCEDURE — 6360000002 HC RX W HCPCS: Performed by: NURSE PRACTITIONER

## 2020-10-07 PROCEDURE — 6370000000 HC RX 637 (ALT 250 FOR IP): Performed by: MIDWIFE

## 2020-10-07 PROCEDURE — 1220000001 HC SEMI PRIVATE L&D R&B

## 2020-10-07 RX ADMIN — Medication 1 MILLI-UNITS/MIN: at 12:33

## 2020-10-07 RX ADMIN — Medication 25 MCG: at 07:55

## 2020-10-07 RX ADMIN — Medication 25 MCG: at 22:00

## 2020-10-07 RX ADMIN — Medication 25 MCG: at 03:05

## 2020-10-07 ASSESSMENT — PAIN DESCRIPTION - DESCRIPTORS: DESCRIPTORS: CRAMPING

## 2020-10-07 NOTE — PROGRESS NOTES
Via  phone, introduced myself to pt, explained POC to her, denies pain. Abdomen soft on palpation.  No questions at this time

## 2020-10-07 NOTE — PROGRESS NOTES
Updated Dr. Lisa Kim of ctx pattern. Ordered to review again in 30 minutes before he will decide on placing Cytotec or not.

## 2020-10-07 NOTE — ANESTHESIA PRE PROCEDURE
Department of Anesthesiology  Preprocedure Note       Name:  Chana Winters   Age:  21 y.o.  :  2000                                          MRN:  [id number]         Date:  10/7/2020      Surgeon: * No surgeons listed *    Procedure: * No procedures listed *    Medications prior to admission:   Prior to Admission medications    Medication Sig Start Date End Date Taking? Authorizing Provider   Prenatal Vit-Fe Fumarate-FA (PRENATAL VITAMINS) 28-0.8 MG TABS Take 1 tablet by mouth daily Indications: samples 20  Yes Amalia Alvarez MD       Current medications:    Current Facility-Administered Medications   Medication Dose Route Frequency Provider Last Rate Last Dose    lactated ringers infusion   Intravenous Continuous Murleen Genin, APRN -  mL/hr at 10/06/20 1641 125 mL/hr at 10/06/20 1641    sodium chloride flush 0.9 % injection 10 mL  10 mL Intravenous 2 times per day Murleen Genin, APRN - CNM        sodium chloride flush 0.9 % injection 10 mL  10 mL Intravenous PRN Murleen Genin, APRN - CNM        docusate sodium (COLACE) capsule 100 mg  100 mg Oral BID Murleen Genin, APRN - CNM        [facility] injection 4 mg  4 mg Intravenous Q6H PRN Murleen Genin, APRN - CNM        misoprostol (CYTOTEC) pre-split tablet TABS 25 mcg  25 mcg Vaginal Q4H Murleen Genin, APRN - CNM   25 mcg at 10/07/20 0790       Allergies:  No Known Allergies    Problem List:    Patient Active Problem List   Diagnosis Code    Small stature R62.52    Short femur of fetus on prenatal ultrasound O35. 8XX0    Small fetus P05.00    Limited prenatal care in third trimester O09.33    Oligohydramnios in third trimester O41. 03X0    Abnormal placenta function test R94.8    38 weeks gestation of pregnancy Z3A.38       Past Medical History:  No past medical history on file. Past Surgical History:  No past surgical history on file.     Social History:    Social History Evaluation  Patient summary reviewed and Nursing notes reviewed no history of anesthetic complications:   Airway: Mallampati: III  TM distance: >3 FB   Neck ROM: full  Mouth opening: > = 3 FB Dental:          Pulmonary:Negative Pulmonary ROS and normal exam  breath sounds clear to auscultation                             Cardiovascular:Negative CV ROS            Rhythm: regular  Rate: normal           Beta Blocker:  Not on Beta Blocker         Neuro/Psych:   Negative Neuro/Psych ROS              GI/Hepatic/Renal:   (+) GERD: well controlled,           Endo/Other:                     Abdominal:           Vascular: negative vascular ROS. Anesthesia Plan      general, spinal and epidural     ASA 2             Anesthetic plan and risks discussed with patient. Use of blood products discussed with patient whom consented to blood products. Plan discussed with CRNA and attending.                   Declan Leary RN   10/7/2020

## 2020-10-07 NOTE — PROGRESS NOTES
Ragini  used to explain oxytocin administration and titration to patient. Patient agreeable and also states she would be interested in IV pain medication when the time comes.  Kendal Parra

## 2020-10-08 PROCEDURE — 1220000000 HC SEMI PRIVATE OB R&B

## 2020-10-08 PROCEDURE — 2580000003 HC RX 258: Performed by: OBSTETRICS & GYNECOLOGY

## 2020-10-08 PROCEDURE — 2500000003 HC RX 250 WO HCPCS: Performed by: OBSTETRICS & GYNECOLOGY

## 2020-10-08 PROCEDURE — 7200000001 HC VAGINAL DELIVERY

## 2020-10-08 PROCEDURE — 88307 TISSUE EXAM BY PATHOLOGIST: CPT

## 2020-10-08 PROCEDURE — 6360000002 HC RX W HCPCS: Performed by: OBSTETRICS & GYNECOLOGY

## 2020-10-08 PROCEDURE — 6370000000 HC RX 637 (ALT 250 FOR IP): Performed by: MIDWIFE

## 2020-10-08 PROCEDURE — 2580000003 HC RX 258: Performed by: MIDWIFE

## 2020-10-08 PROCEDURE — 0HQ9XZZ REPAIR PERINEUM SKIN, EXTERNAL APPROACH: ICD-10-PCS | Performed by: OBSTETRICS & GYNECOLOGY

## 2020-10-08 PROCEDURE — 6370000000 HC RX 637 (ALT 250 FOR IP): Performed by: OBSTETRICS & GYNECOLOGY

## 2020-10-08 RX ORDER — METHYLERGONOVINE MALEATE 0.2 MG/ML
200 INJECTION INTRAVENOUS ONCE
Status: COMPLETED | OUTPATIENT
Start: 2020-10-08 | End: 2020-10-08

## 2020-10-08 RX ORDER — SODIUM CHLORIDE 0.9 % (FLUSH) 0.9 %
10 SYRINGE (ML) INJECTION EVERY 12 HOURS SCHEDULED
Status: DISCONTINUED | OUTPATIENT
Start: 2020-10-08 | End: 2020-10-10 | Stop reason: HOSPADM

## 2020-10-08 RX ORDER — SODIUM CHLORIDE, SODIUM LACTATE, POTASSIUM CHLORIDE, CALCIUM CHLORIDE 600; 310; 30; 20 MG/100ML; MG/100ML; MG/100ML; MG/100ML
INJECTION, SOLUTION INTRAVENOUS CONTINUOUS
Status: DISCONTINUED | OUTPATIENT
Start: 2020-10-08 | End: 2020-10-10 | Stop reason: HOSPADM

## 2020-10-08 RX ORDER — ONDANSETRON 4 MG/1
4 TABLET, ORALLY DISINTEGRATING ORAL EVERY 6 HOURS PRN
Status: DISCONTINUED | OUTPATIENT
Start: 2020-10-08 | End: 2020-10-10 | Stop reason: HOSPADM

## 2020-10-08 RX ORDER — BISACODYL 10 MG
10 SUPPOSITORY, RECTAL RECTAL DAILY PRN
Status: DISCONTINUED | OUTPATIENT
Start: 2020-10-08 | End: 2020-10-10 | Stop reason: HOSPADM

## 2020-10-08 RX ORDER — ACETAMINOPHEN 650 MG
TABLET, EXTENDED RELEASE ORAL PRN
Status: DISCONTINUED | OUTPATIENT
Start: 2020-10-08 | End: 2020-10-08

## 2020-10-08 RX ORDER — HYDROCODONE BITARTRATE AND ACETAMINOPHEN 5; 325 MG/1; MG/1
1 TABLET ORAL EVERY 4 HOURS PRN
Status: DISCONTINUED | OUTPATIENT
Start: 2020-10-08 | End: 2020-10-10 | Stop reason: HOSPADM

## 2020-10-08 RX ORDER — FERROUS SULFATE 325(65) MG
325 TABLET ORAL
Status: DISCONTINUED | OUTPATIENT
Start: 2020-10-08 | End: 2020-10-10 | Stop reason: HOSPADM

## 2020-10-08 RX ORDER — ACETAMINOPHEN 325 MG/1
650 TABLET ORAL EVERY 4 HOURS PRN
Status: DISCONTINUED | OUTPATIENT
Start: 2020-10-08 | End: 2020-10-10 | Stop reason: HOSPADM

## 2020-10-08 RX ORDER — SODIUM CHLORIDE 0.9 % (FLUSH) 0.9 %
10 SYRINGE (ML) INJECTION PRN
Status: DISCONTINUED | OUTPATIENT
Start: 2020-10-08 | End: 2020-10-10 | Stop reason: HOSPADM

## 2020-10-08 RX ORDER — DOCUSATE SODIUM 100 MG/1
100 CAPSULE, LIQUID FILLED ORAL 2 TIMES DAILY
Status: DISCONTINUED | OUTPATIENT
Start: 2020-10-08 | End: 2020-10-10 | Stop reason: HOSPADM

## 2020-10-08 RX ORDER — IBUPROFEN 800 MG/1
800 TABLET ORAL EVERY 8 HOURS PRN
Status: DISCONTINUED | OUTPATIENT
Start: 2020-10-08 | End: 2020-10-10 | Stop reason: HOSPADM

## 2020-10-08 RX ORDER — LIDOCAINE HYDROCHLORIDE 10 MG/ML
5 INJECTION, SOLUTION EPIDURAL; INFILTRATION; INTRACAUDAL; PERINEURAL ONCE
Status: COMPLETED | OUTPATIENT
Start: 2020-10-08 | End: 2020-10-08

## 2020-10-08 RX ORDER — PANTOPRAZOLE SODIUM 40 MG/1
40 TABLET, DELAYED RELEASE ORAL DAILY PRN
Status: DISCONTINUED | OUTPATIENT
Start: 2020-10-08 | End: 2020-10-10 | Stop reason: HOSPADM

## 2020-10-08 RX ORDER — MODIFIED LANOLIN
OINTMENT (GRAM) TOPICAL PRN
Status: DISCONTINUED | OUTPATIENT
Start: 2020-10-08 | End: 2020-10-10 | Stop reason: HOSPADM

## 2020-10-08 RX ORDER — HYDROCODONE BITARTRATE AND ACETAMINOPHEN 5; 325 MG/1; MG/1
2 TABLET ORAL EVERY 4 HOURS PRN
Status: DISCONTINUED | OUTPATIENT
Start: 2020-10-08 | End: 2020-10-10 | Stop reason: HOSPADM

## 2020-10-08 RX ORDER — SIMETHICONE 80 MG
80 TABLET,CHEWABLE ORAL EVERY 6 HOURS PRN
Status: DISCONTINUED | OUTPATIENT
Start: 2020-10-08 | End: 2020-10-10 | Stop reason: HOSPADM

## 2020-10-08 RX ADMIN — METHYLERGONOVINE MALEATE 200 MCG: 0.2 INJECTION, SOLUTION INTRAMUSCULAR; INTRAVENOUS at 07:06

## 2020-10-08 RX ADMIN — SODIUM CHLORIDE, PRESERVATIVE FREE 10 ML: 5 INJECTION INTRAVENOUS at 20:22

## 2020-10-08 RX ADMIN — Medication 25 MCG: at 02:00

## 2020-10-08 RX ADMIN — HYDROCODONE BITARTRATE AND ACETAMINOPHEN 2 TABLET: 5; 325 TABLET ORAL at 07:33

## 2020-10-08 RX ADMIN — BUTORPHANOL TARTRATE 1 MG: 2 INJECTION, SOLUTION INTRAMUSCULAR; INTRAVENOUS at 05:19

## 2020-10-08 RX ADMIN — SODIUM CHLORIDE, POTASSIUM CHLORIDE, SODIUM LACTATE AND CALCIUM CHLORIDE: 600; 310; 30; 20 INJECTION, SOLUTION INTRAVENOUS at 00:20

## 2020-10-08 RX ADMIN — Medication: at 06:55

## 2020-10-08 RX ADMIN — DOCUSATE SODIUM 100 MG: 100 CAPSULE, LIQUID FILLED ORAL at 20:21

## 2020-10-08 RX ADMIN — LIDOCAINE HYDROCHLORIDE 5 ML: 10 INJECTION, SOLUTION EPIDURAL; INFILTRATION; INTRACAUDAL; PERINEURAL at 07:05

## 2020-10-08 RX ADMIN — IBUPROFEN 800 MG: 800 TABLET, FILM COATED ORAL at 17:29

## 2020-10-08 RX ADMIN — BUTORPHANOL TARTRATE 1 MG: 2 INJECTION, SOLUTION INTRAMUSCULAR; INTRAVENOUS at 02:16

## 2020-10-08 ASSESSMENT — PAIN SCALES - GENERAL
PAINLEVEL_OUTOF10: 0
PAINLEVEL_OUTOF10: 7
PAINLEVEL_OUTOF10: 8
PAINLEVEL_OUTOF10: 8
PAINLEVEL_OUTOF10: 3

## 2020-10-08 ASSESSMENT — PAIN DESCRIPTION - FREQUENCY: FREQUENCY: INTERMITTENT

## 2020-10-08 ASSESSMENT — PAIN DESCRIPTION - ORIENTATION: ORIENTATION: MID;LOWER

## 2020-10-08 ASSESSMENT — PAIN DESCRIPTION - PAIN TYPE: TYPE: ACUTE PAIN

## 2020-10-08 ASSESSMENT — PAIN DESCRIPTION - DESCRIPTORS: DESCRIPTORS: CRAMPING

## 2020-10-08 ASSESSMENT — PAIN DESCRIPTION - LOCATION: LOCATION: BACK

## 2020-10-08 NOTE — PROGRESS NOTES
Dr Keila Scott updated pt is now having pain. Updated on most recent cervical exam, pt still the same 1-2/80/-1. Orders received that pt is allowed to shower and eat, then to be re-started on cytotec Q4.

## 2020-10-08 NOTE — PROGRESS NOTES
DELIVERY NOTE    male infant   APGARS 9/9  Bulb suction on perineum  Cord clamped and cut after 30 second delay  Handed to waiting nursing staff  Placenta delivered without complication with three vessel cord intact  Methergine im given for uterine atony  First degree vag lac repaired with vicryl = hemostatic  Cord gases obtained  No complications  Condition stable  Sponge count correct  Mom and baby to post partum    Flynn Collins

## 2020-10-08 NOTE — PROGRESS NOTES
Got pt up for stanley care, pt tolerated it well. Using gestures instructed her on stanley care and bleeding precautions. Pt sat up on the couch.

## 2020-10-08 NOTE — PROGRESS NOTES
Up to BR to Void,pancho well. Instructed on stanley care and bleeding precautions. Pt nods her head to understanding. Returned to bed, without assistance. IVF stopped.

## 2020-10-08 NOTE — PROGRESS NOTES
Dr Aguirre Links updated pt is 6/100/0. States she is feeling pressure. SROM @ 0600.  No new orders at this time, will keep physician updated

## 2020-10-08 NOTE — PROGRESS NOTES
of baby  with  at 0700. APGARS 9/9, delayed cord clamping performed, skin to skin immedialty initiated. Mom and baby resting at this time.

## 2020-10-09 LAB
GLUCOSE URINE, POC: NORMAL
HCT VFR BLD CALC: 26.9 % (ref 34–48)
HEMOGLOBIN: 8.9 G/DL (ref 11.5–15.5)
PROTEIN UA: NEGATIVE

## 2020-10-09 PROCEDURE — 85018 HEMOGLOBIN: CPT

## 2020-10-09 PROCEDURE — 36415 COLL VENOUS BLD VENIPUNCTURE: CPT

## 2020-10-09 PROCEDURE — 99231 SBSQ HOSP IP/OBS SF/LOW 25: CPT | Performed by: NURSE PRACTITIONER

## 2020-10-09 PROCEDURE — 85014 HEMATOCRIT: CPT

## 2020-10-09 PROCEDURE — 6370000000 HC RX 637 (ALT 250 FOR IP): Performed by: OBSTETRICS & GYNECOLOGY

## 2020-10-09 PROCEDURE — 1220000000 HC SEMI PRIVATE OB R&B

## 2020-10-09 RX ADMIN — IBUPROFEN 800 MG: 800 TABLET, FILM COATED ORAL at 09:00

## 2020-10-09 RX ADMIN — IBUPROFEN 800 MG: 800 TABLET, FILM COATED ORAL at 20:39

## 2020-10-09 RX ADMIN — FERROUS SULFATE TAB 325 MG (65 MG ELEMENTAL FE) 325 MG: 325 (65 FE) TAB at 08:39

## 2020-10-09 RX ADMIN — FERROUS SULFATE TAB 325 MG (65 MG ELEMENTAL FE) 325 MG: 325 (65 FE) TAB at 16:14

## 2020-10-09 RX ADMIN — DOCUSATE SODIUM 100 MG: 100 CAPSULE, LIQUID FILLED ORAL at 20:39

## 2020-10-09 RX ADMIN — IBUPROFEN 800 MG: 800 TABLET, FILM COATED ORAL at 03:08

## 2020-10-09 RX ADMIN — DOCUSATE SODIUM 100 MG: 100 CAPSULE, LIQUID FILLED ORAL at 08:38

## 2020-10-09 ASSESSMENT — PAIN SCALES - GENERAL
PAINLEVEL_OUTOF10: 3
PAINLEVEL_OUTOF10: 3
PAINLEVEL_OUTOF10: 7

## 2020-10-09 ASSESSMENT — PAIN DESCRIPTION - RADICULAR PAIN: RADICULAR_PAIN: ABSENT

## 2020-10-09 NOTE — PROGRESS NOTES
Unable to obtain Gregor Pichardo interpretor all day today until now, so did discharge teaching for mom and baby now, reviewed all aspects of mom/baby discharge teaching including safe sleep and shaken baby. Patient verbalizes understanding of all teaching, she states she has a crib and a car seat for the infant. Explained through the  that the car seat has to be 6 years or newer and to leave the base in the car and have her family member bring the car seat up tomorrow. Answers pt's questions through the .

## 2020-10-10 VITALS
HEART RATE: 78 BPM | BODY MASS INDEX: 30.44 KG/M2 | WEIGHT: 145 LBS | HEIGHT: 58 IN | TEMPERATURE: 98.6 F | SYSTOLIC BLOOD PRESSURE: 108 MMHG | DIASTOLIC BLOOD PRESSURE: 69 MMHG | RESPIRATION RATE: 18 BRPM

## 2020-10-10 PROBLEM — Z3A.38 38 WEEKS GESTATION OF PREGNANCY: Status: RESOLVED | Noted: 2020-10-06 | Resolved: 2020-10-10

## 2020-10-10 PROBLEM — R94.8 ABNORMAL PLACENTA FUNCTION TEST: Status: RESOLVED | Noted: 2020-10-06 | Resolved: 2020-10-10

## 2020-10-10 PROBLEM — O35.HXX0 SHORT FEMUR OF FETUS ON PRENATAL ULTRASOUND: Status: RESOLVED | Noted: 2020-09-29 | Resolved: 2020-10-10

## 2020-10-10 PROBLEM — O41.03X0 OLIGOHYDRAMNIOS IN THIRD TRIMESTER: Status: RESOLVED | Noted: 2020-10-06 | Resolved: 2020-10-10

## 2020-10-10 PROBLEM — R62.52 SMALL STATURE: Status: RESOLVED | Noted: 2020-09-29 | Resolved: 2020-10-10

## 2020-10-10 PROBLEM — O09.33 LIMITED PRENATAL CARE IN THIRD TRIMESTER: Status: RESOLVED | Noted: 2020-09-29 | Resolved: 2020-10-10

## 2020-10-10 PROCEDURE — 99238 HOSP IP/OBS DSCHRG MGMT 30/<: CPT | Performed by: OBSTETRICS & GYNECOLOGY

## 2020-10-10 PROCEDURE — 90707 MMR VACCINE SC: CPT | Performed by: OBSTETRICS & GYNECOLOGY

## 2020-10-10 PROCEDURE — 90471 IMMUNIZATION ADMIN: CPT | Performed by: OBSTETRICS & GYNECOLOGY

## 2020-10-10 PROCEDURE — 6360000002 HC RX W HCPCS: Performed by: OBSTETRICS & GYNECOLOGY

## 2020-10-10 PROCEDURE — 6370000000 HC RX 637 (ALT 250 FOR IP): Performed by: OBSTETRICS & GYNECOLOGY

## 2020-10-10 RX ORDER — FERROUS SULFATE 325(65) MG
325 TABLET ORAL
Qty: 30 TABLET | Refills: 1 | Status: SHIPPED | OUTPATIENT
Start: 2020-10-10

## 2020-10-10 RX ADMIN — MEASLES, MUMPS, AND RUBELLA VIRUS VACCINE LIVE 0.5 ML: 1000; 12500; 1000 INJECTION, POWDER, LYOPHILIZED, FOR SUSPENSION SUBCUTANEOUS at 16:12

## 2020-10-10 RX ADMIN — IBUPROFEN 800 MG: 800 TABLET, FILM COATED ORAL at 05:18

## 2020-10-10 ASSESSMENT — PAIN SCALES - GENERAL: PAINLEVEL_OUTOF10: 3

## 2020-10-10 NOTE — FLOWSHEET NOTE
Called language line again. There is no [facility]  at this time. They will call me back later today when one is available.

## 2020-10-10 NOTE — FLOWSHEET NOTE
Baby sleeping in bed with mother. Advised mother, via demonstration and signs that under no circumstance is baby to be in bed with mother. Trying to get  but no one available.

## 2020-10-10 NOTE — DISCHARGE SUMMARY
Obstetrical Discharge Form    Spoke with patient via  over phone  Primary OB Clinician: Rody Ch  Postpartum  Day #2    Gestational Age at time of delivery: 41.4    Date of Delivery: 10/8/20     Delivery Type: spontaneous vaginal delivery    Baby: Liveborn male,     Intrapartum complications: None    Laceration: 1st degree    Episiotomy: none    Discharge Date: 10/10/20    Condition: stable  Plan:     Follow-up appointment with Dr. Rody Ch in 6 weeks.

## 2020-10-10 NOTE — PLAN OF CARE
Problem: Pain:  Goal: Control of acute pain  Description: Control of acute pain  Outcome: Met This Shift     Problem: Fluid Volume - Imbalance:  Goal: Absence of postpartum hemorrhage signs and symptoms  Description: Absence of postpartum hemorrhage signs and symptoms  Outcome: Met This Shift     Problem: Infection - Risk of, Puerperal Infection:  Goal: Will show no infection signs and symptoms  Description: Will show no infection signs and symptoms  Outcome: Met This Shift

## 2021-07-07 ENCOUNTER — TELEPHONE (OUTPATIENT)
Dept: INTERNAL MEDICINE | Age: 21
End: 2021-07-07

## 2021-07-07 NOTE — TELEPHONE ENCOUNTER
ISRAELW met with pt at [facility] in [place]. Pt is currently living in [place].  CHW completed with pt and sent financial forms via e-mail. (8/19/2020,8/25/2021,9/10/2020,9/17/2020,9/18/2020,9/24/2020,9/29/2020,10/1/2020,10/06/2020,10/09/2020, 10/10/2020)

## 2024-03-04 NOTE — CARE COORDINATION
From: Jill Mcginnis  To: Kem Enrique  Sent: 3/4/2024 9:50 AM CST  Subject: Medication    Hi Dr. Enrique!   I am so sorry to bother but would you be able to give me a call at [phone] when you are not busy any day.     Thank you!   
SW Discharge Planning     JA called [facility] ( [phone]) and spoke with Gina Solis in intake, who reported that [facility] ( [phone]) will NOT be involved at this time.  JA reinforced concerns, however Gina Solis reported again that they will NOT be involved    PLAN    Baby CAN be discharged home when medically ready, [facility] ( [phone])  will NOT be involved      Electronically signed by FRANCK Mercedes on 10/9/2020 at 3:05 PM
[facility] ( [phone]) referral to [address] [place] [address] in intake. PLAN    Baby can NOT be discharged home until [facility] ( [phone]) provides disposition  SW to continue communication with nursing staff and [facility] ( [phone])    SW provided mother with a car seat.          Electronically signed by FRANCK Madden on 10/9/2020 at 2:19 PM